# Patient Record
Sex: MALE | Race: WHITE | NOT HISPANIC OR LATINO | Employment: OTHER | URBAN - METROPOLITAN AREA
[De-identification: names, ages, dates, MRNs, and addresses within clinical notes are randomized per-mention and may not be internally consistent; named-entity substitution may affect disease eponyms.]

---

## 2023-08-26 ENCOUNTER — HOSPITAL ENCOUNTER (EMERGENCY)
Facility: HOSPITAL | Age: 83
Discharge: HOME | End: 2023-08-27
Attending: EMERGENCY MEDICINE
Payer: MEDICARE

## 2023-08-26 VITALS
WEIGHT: 167 LBS | HEIGHT: 67 IN | DIASTOLIC BLOOD PRESSURE: 82 MMHG | HEART RATE: 50 BPM | OXYGEN SATURATION: 96 % | BODY MASS INDEX: 26.21 KG/M2 | SYSTOLIC BLOOD PRESSURE: 176 MMHG | RESPIRATION RATE: 13 BRPM | TEMPERATURE: 98 F

## 2023-08-26 DIAGNOSIS — I63.9 ACUTE CVA (CEREBROVASCULAR ACCIDENT): Primary | ICD-10-CM

## 2023-08-26 DIAGNOSIS — I48.20 CHRONIC ATRIAL FIBRILLATION: ICD-10-CM

## 2023-08-26 DIAGNOSIS — I63.9 CVA (CEREBRAL VASCULAR ACCIDENT): ICD-10-CM

## 2023-08-26 PROBLEM — I65.23 BILATERAL CAROTID ARTERY OCCLUSION: Status: ACTIVE | Noted: 2023-08-26

## 2023-08-26 PROBLEM — I10 HYPERTENSION: Status: ACTIVE | Noted: 2023-08-26

## 2023-08-26 PROBLEM — G45.9 TIA (TRANSIENT ISCHEMIC ATTACK): Status: ACTIVE | Noted: 2023-08-26

## 2023-08-26 PROBLEM — I48.11 LONGSTANDING PERSISTENT ATRIAL FIBRILLATION: Status: ACTIVE | Noted: 2023-08-26

## 2023-08-26 PROBLEM — J61 ASBESTOSIS: Status: ACTIVE | Noted: 2023-08-26

## 2023-08-26 PROBLEM — K21.9 GASTROESOPHAGEAL REFLUX DISEASE: Status: ACTIVE | Noted: 2023-08-26

## 2023-08-26 PROBLEM — E78.2 MIXED HYPERLIPIDEMIA: Status: ACTIVE | Noted: 2023-08-26

## 2023-08-26 PROBLEM — I25.10 CORONARY ARTERIOSCLEROSIS IN NATIVE ARTERY: Status: ACTIVE | Noted: 2023-08-26

## 2023-08-26 PROBLEM — Z95.0 PACEMAKER: Status: ACTIVE | Noted: 2023-08-26

## 2023-08-26 LAB
ALBUMIN SERPL BCP-MCNC: 3.6 G/DL (ref 3.5–5.2)
ALP SERPL-CCNC: 97 U/L (ref 55–135)
ALT SERPL W/O P-5'-P-CCNC: 13 U/L (ref 10–44)
ANION GAP SERPL CALC-SCNC: 11 MMOL/L (ref 8–16)
AST SERPL-CCNC: 15 U/L (ref 10–40)
BACTERIA #/AREA URNS HPF: ABNORMAL /HPF
BASOPHILS # BLD AUTO: 0.03 K/UL (ref 0–0.2)
BASOPHILS NFR BLD: 0.6 % (ref 0–1.9)
BILIRUB SERPL-MCNC: 0.7 MG/DL (ref 0.1–1)
BILIRUB UR QL STRIP: NEGATIVE
BUN SERPL-MCNC: 31 MG/DL (ref 8–23)
CALCIUM SERPL-MCNC: 8.6 MG/DL (ref 8.7–10.5)
CHLORIDE SERPL-SCNC: 109 MMOL/L (ref 95–110)
CLARITY UR: CLEAR
CO2 SERPL-SCNC: 20 MMOL/L (ref 23–29)
COLOR UR: YELLOW
CREAT SERPL-MCNC: 2 MG/DL (ref 0.5–1.4)
DIFFERENTIAL METHOD: ABNORMAL
EOSINOPHIL # BLD AUTO: 0.1 K/UL (ref 0–0.5)
EOSINOPHIL NFR BLD: 1.5 % (ref 0–8)
ERYTHROCYTE [DISTWIDTH] IN BLOOD BY AUTOMATED COUNT: 15.7 % (ref 11.5–14.5)
EST. GFR  (NO RACE VARIABLE): 32.3 ML/MIN/1.73 M^2
ETHANOL SERPL-MCNC: <10 MG/DL (ref 0–10)
GLUCOSE SERPL-MCNC: 99 MG/DL (ref 70–110)
GLUCOSE UR QL STRIP: NEGATIVE
HCT VFR BLD AUTO: 39.2 % (ref 40–54)
HGB BLD-MCNC: 12.5 G/DL (ref 14–18)
HGB UR QL STRIP: NEGATIVE
HYALINE CASTS #/AREA URNS LPF: ABNORMAL /LPF
IMM GRANULOCYTES # BLD AUTO: 0.01 K/UL (ref 0–0.04)
IMM GRANULOCYTES NFR BLD AUTO: 0.2 % (ref 0–0.5)
INR PPP: 1.2 (ref 0.8–1.2)
KETONES UR QL STRIP: ABNORMAL
LEUKOCYTE ESTERASE UR QL STRIP: NEGATIVE
LYMPHOCYTES # BLD AUTO: 1 K/UL (ref 1–4.8)
LYMPHOCYTES NFR BLD: 17.6 % (ref 18–48)
MAGNESIUM SERPL-MCNC: 2 MG/DL (ref 1.6–2.6)
MCH RBC QN AUTO: 28.3 PG (ref 27–31)
MCHC RBC AUTO-ENTMCNC: 31.9 G/DL (ref 32–36)
MCV RBC AUTO: 89 FL (ref 82–98)
MICROSCOPIC COMMENT: ABNORMAL
MONOCYTES # BLD AUTO: 0.5 K/UL (ref 0.3–1)
MONOCYTES NFR BLD: 10 % (ref 4–15)
NEUTROPHILS # BLD AUTO: 3.8 K/UL (ref 1.8–7.7)
NEUTROPHILS NFR BLD: 70.1 % (ref 38–73)
NITRITE UR QL STRIP: NEGATIVE
NRBC BLD-RTO: 0 /100 WBC
PH UR STRIP: 6 [PH] (ref 5–8)
PLATELET # BLD AUTO: 135 K/UL (ref 150–450)
PMV BLD AUTO: 10.3 FL (ref 9.2–12.9)
POTASSIUM SERPL-SCNC: 4 MMOL/L (ref 3.5–5.1)
PROT SERPL-MCNC: 7.3 G/DL (ref 6–8.4)
PROT UR QL STRIP: ABNORMAL
PROTHROMBIN TIME: 12.7 SEC (ref 9–12.5)
RBC # BLD AUTO: 4.42 M/UL (ref 4.6–6.2)
RBC #/AREA URNS HPF: 0 /HPF (ref 0–4)
SODIUM SERPL-SCNC: 140 MMOL/L (ref 136–145)
SP GR UR STRIP: 1.02 (ref 1–1.03)
SQUAMOUS #/AREA URNS HPF: ABNORMAL /HPF
URN SPEC COLLECT METH UR: ABNORMAL
UROBILINOGEN UR STRIP-ACNC: 1 EU/DL
WBC # BLD AUTO: 5.39 K/UL (ref 3.9–12.7)
WBC #/AREA URNS HPF: 0 /HPF (ref 0–5)

## 2023-08-26 PROCEDURE — 96360 HYDRATION IV INFUSION INIT: CPT

## 2023-08-26 PROCEDURE — 99285 EMERGENCY DEPT VISIT HI MDM: CPT | Mod: 25

## 2023-08-26 PROCEDURE — 85025 COMPLETE CBC W/AUTO DIFF WBC: CPT | Performed by: EMERGENCY MEDICINE

## 2023-08-26 PROCEDURE — 93005 ELECTROCARDIOGRAM TRACING: CPT

## 2023-08-26 PROCEDURE — 93010 ELECTROCARDIOGRAM REPORT: CPT | Mod: ,,, | Performed by: INTERNAL MEDICINE

## 2023-08-26 PROCEDURE — 82077 ASSAY SPEC XCP UR&BREATH IA: CPT | Performed by: EMERGENCY MEDICINE

## 2023-08-26 PROCEDURE — 70450 CT HEAD/BRAIN W/O DYE: CPT | Mod: TC

## 2023-08-26 PROCEDURE — 70450 CT HEAD/BRAIN W/O DYE: CPT | Mod: 26,,, | Performed by: RADIOLOGY

## 2023-08-26 PROCEDURE — 36415 COLL VENOUS BLD VENIPUNCTURE: CPT | Performed by: EMERGENCY MEDICINE

## 2023-08-26 PROCEDURE — 93010 EKG 12-LEAD: ICD-10-PCS | Mod: ,,, | Performed by: INTERNAL MEDICINE

## 2023-08-26 PROCEDURE — 70450 CT HEAD WITHOUT CONTRAST: ICD-10-PCS | Mod: 26,,, | Performed by: RADIOLOGY

## 2023-08-26 PROCEDURE — G0426 PR INPT TELEHEALTH CONSULT 50M: ICD-10-PCS | Mod: 95,,, | Performed by: PSYCHIATRY & NEUROLOGY

## 2023-08-26 PROCEDURE — 81000 URINALYSIS NONAUTO W/SCOPE: CPT | Performed by: EMERGENCY MEDICINE

## 2023-08-26 PROCEDURE — G0426 INPT/ED TELECONSULT50: HCPCS | Mod: 95,,, | Performed by: PSYCHIATRY & NEUROLOGY

## 2023-08-26 PROCEDURE — 25000003 PHARM REV CODE 250: Performed by: EMERGENCY MEDICINE

## 2023-08-26 PROCEDURE — 83735 ASSAY OF MAGNESIUM: CPT | Performed by: EMERGENCY MEDICINE

## 2023-08-26 PROCEDURE — 85610 PROTHROMBIN TIME: CPT | Performed by: EMERGENCY MEDICINE

## 2023-08-26 PROCEDURE — 80053 COMPREHEN METABOLIC PANEL: CPT | Performed by: EMERGENCY MEDICINE

## 2023-08-26 RX ORDER — ASPIRIN 325 MG
325 TABLET ORAL
Status: COMPLETED | OUTPATIENT
Start: 2023-08-26 | End: 2023-08-26

## 2023-08-26 RX ORDER — ALPRAZOLAM 1 MG/1
TABLET, EXTENDED RELEASE ORAL DAILY
COMMUNITY

## 2023-08-26 RX ORDER — ATORVASTATIN CALCIUM 40 MG/1
40 TABLET, FILM COATED ORAL DAILY
COMMUNITY

## 2023-08-26 RX ORDER — SODIUM CHLORIDE 9 MG/ML
1000 INJECTION, SOLUTION INTRAVENOUS
Status: COMPLETED | OUTPATIENT
Start: 2023-08-26 | End: 2023-08-26

## 2023-08-26 RX ORDER — CLOPIDOGREL BISULFATE 75 MG/1
300 TABLET ORAL
Status: COMPLETED | OUTPATIENT
Start: 2023-08-26 | End: 2023-08-26

## 2023-08-26 RX ORDER — METOPROLOL TARTRATE 25 MG/1
25 TABLET, FILM COATED ORAL 2 TIMES DAILY
COMMUNITY

## 2023-08-26 RX ORDER — HYDROCODONE BITARTRATE AND ACETAMINOPHEN 10; 325 MG/1; MG/1
1 TABLET ORAL
Status: COMPLETED | OUTPATIENT
Start: 2023-08-26 | End: 2023-08-26

## 2023-08-26 RX ORDER — LEVOFLOXACIN 250 MG/1
250 TABLET ORAL DAILY
Status: ON HOLD | COMMUNITY
End: 2023-08-29 | Stop reason: HOSPADM

## 2023-08-26 RX ORDER — VALSARTAN 320 MG/1
320 TABLET ORAL DAILY
COMMUNITY

## 2023-08-26 RX ORDER — VITAMIN B COMPLEX
1 CAPSULE ORAL DAILY
COMMUNITY

## 2023-08-26 RX ORDER — PANTOPRAZOLE SODIUM 40 MG/1
40 TABLET, DELAYED RELEASE ORAL DAILY
COMMUNITY

## 2023-08-26 RX ADMIN — HYDROCODONE BITARTRATE AND ACETAMINOPHEN 1 TABLET: 10; 325 TABLET ORAL at 10:08

## 2023-08-26 RX ADMIN — ASPIRIN 325 MG ORAL TABLET 325 MG: 325 PILL ORAL at 04:08

## 2023-08-26 RX ADMIN — SODIUM CHLORIDE 1000 ML: 9 INJECTION, SOLUTION INTRAVENOUS at 06:08

## 2023-08-26 RX ADMIN — SODIUM CHLORIDE 500 ML: 9 INJECTION, SOLUTION INTRAVENOUS at 05:08

## 2023-08-26 RX ADMIN — SODIUM CHLORIDE 1000 ML: 9 INJECTION, SOLUTION INTRAVENOUS at 04:08

## 2023-08-26 RX ADMIN — CLOPIDOGREL BISULFATE 300 MG: 75 TABLET ORAL at 06:08

## 2023-08-26 NOTE — CONSULTS
[facility]  Vascular Neurology  Comprehensive Stroke Center  TeleVascular Neurology Acute Consultation Note      Consults    Consulting Provider: ADDIE SHAFER  Current Providers  No providers found    Patient Location:  [facility] EMERGENCY DEPARTMENT Emergency Department  Spoke hospital nurse at bedside with patient assisting consultant.     Patient information was obtained from patient.         Assessment/Plan:       Diagnoses:   Neuro  TIA (transient ischemic attack)  Majority of symptoms are now resolved (Left face, dysarthria)  Some subtle e/o sensory extension on left which is not 100% present on multiple exams.  Plan on stat CTA @ spoke to r/o high risk vascular lesion requiring intervention or further monitoring and transfer.  Load aspirin 325 mg & clopidogrel 300 mg x 1 now, followed by daily aspirin 81 mg /  clopidogrel 75 mg x 30 days followed by monotherapy therafter  Recommend:  - MRI brain w/o contrast to evaluate for presence and characterization of infarct  - TTE   - lipid profile and A1c for evaluation of modifiable risk factors  - PT/OT/SLP eval and Rx  - Permissive HTN < 220/120 mmHg x48-72h pending results of vessel imaging            STROKE DOCUMENTATION     Acute Stroke Times:   Acute Stroke Times   Last Known Normal Time: 1550  Symptom Onset Date: 08/26/23  Symptom Onset Time: 1550  Stroke Team Called Time: 1642  Stroke Team Arrival Time: 1646  CT Interpretation Time: 1646    NIH Scale:  1a. Level of Consciousness: 0-->Alert, keenly responsive  1b. LOC Questions: 0-->Answers both questions correctly  1c. LOC Commands: 0-->Performs both tasks correctly  2. Best Gaze: 0-->Normal  3. Visual: 0-->No visual loss  4. Facial Palsy: 0-->Normal symmetrical movements  5a. Motor Arm, Left: 0-->No drift, limb holds 90 (or 45) degrees for full 10 secs  5b. Motor Arm, Right: 0-->No drift, limb holds 90 (or 45) degrees for full 10 secs  6a. Motor Leg, Left: 0-->No  "drift, leg holds 30 degree position for full 5 secs  6b. Motor Leg, Right: 0-->No drift, leg holds 30 degree position for full 5 secs  7. Limb Ataxia: 0-->Absent  8. Sensory: 0-->Normal, no sensory loss  9. Best Language: 0-->No aphasia, normal  10. Dysarthria: 0-->Normal  11. Extinction and Inattention (formerly Neglect): 1-->Visual, tactile, auditory, spatial, or personal inattention or extinction to bilateral simultaneous stimulation in one of the sensory modalities  Total (NIH Stroke Scale): 1     Modified Jameel    [place] Coma Scale:    ABCD2 Score:    [id number] Score:   HAS -BLED Score:   ICH Score:   Hunt & Hernandez Classification:       Blood pressure (!) 148/76, pulse 63, resp. rate 18, height 5' 7" (1.702 m), weight 75.8 kg (167 lb), SpO2 98 %.  Eligible for thrombolytic therapy?: No  Thrombolytic therapy recomended: Thrombolytic therapy not recommended due to Mild Non-Disabling Symptoms  Possible Interventional Revascularization Candidate? No; no significant neurologic deficit (NIHSS <6)     Disposition Recommendation: pending further studies    Subjective:     History of Present Illness:  83M while outside setting up a fence he started to have slurred speech and facial asymmetry with left sided weakness.          No new subjective & objective note has been filed under this hospital service since the last note was generated.      Recommended the emergency room physician to have a brief discussion with the patient and/or family if available regarding the  risks and benefits of treatment, and to briefly document the occurrence of that discussion in his clinical encounter note.     The attending portion of this evaluation, treatment, and documentation was performed per Ministerio Acuna MD via audiovisual.    Billing code:  (non-intervention mild to moderate stroke, TIA, some mimics)      This patient has a critical neurological condition/illness, with some potential for high morbidity and " mortality.  There is a moderate probability for acute neurological change leading to clinical and possibly life-threatening deterioration requiring highest level of physician preparedness for urgent intervention.  Care was coordinated with other physicians involved in the patient's care.  Radiologic studies and laboratory data were reviewed and interpreted, and plan of care was re-assessed based on the results.  Diagnosis, treatment options and prognosis may have been discussed with the patient and/or family members or caregiver.    In your opinion, this was a: Tier 1 Van Positive    Consult End Time: 5:06 PM     Ministerio Acuna MD  Comprehensive Stroke Center  Vascular Neurology   [facility]

## 2023-08-26 NOTE — ASSESSMENT & PLAN NOTE
Majority of symptoms are now resolved (Left face, dysarthria)  Some subtle e/o sensory extension on left which is not 100% present on multiple exams.  Plan on stat CTA @ spoke to r/o high risk vascular lesion requiring intervention or further monitoring and transfer.  Load aspirin 325 mg & clopidogrel 300 mg x 1 now, followed by daily aspirin 81 mg /  clopidogrel 75 mg x 30 days followed by monotherapy therafter  Recommend:  - MRI brain w/o contrast to evaluate for presence and characterization of infarct  - TTE   - lipid profile and A1c for evaluation of modifiable risk factors  - PT/OT/SLP eval and Rx  - Permissive HTN < 220/120 mmHg x48-72h pending results of vessel imaging

## 2023-08-26 NOTE — PROVIDER PROGRESS NOTES - EMERGENCY DEPT.
Encounter Date: 8/26/2023    ED Physician Progress Notes        Physician Note:   Discussed case with  Dr Maurer and per neuro recs, pt will not be anticoagulated at this time. The want ASA and plavid 300mg load. Transfer to [facility] in progress. Pt and family agree with plan.

## 2023-08-26 NOTE — ED PROVIDER NOTES
Encounter Date: 8/26/2023       History   No chief complaint on file.    Patient is an 83-year-old male here with medics who were called because the patient developed acute left-sided weakness and slurred speech.  Last known normal 15:50.  Patient reports he was working outside on a fence when he felt a little funny and had family and friends  on his left-sided weakness.  No history of stroke.  Patient does report having 1 beer earlier today while outside working.  No focal pain currently.  No nausea or vomiting.    The history is provided by the patient and the EMS personnel.     Review of patient's allergies indicates:  Not on File  No past medical history on file.  No past surgical history on file.  No family history on file.     Review of Systems   Constitutional:  Negative for fever.   HENT:  Negative for sore throat.    Respiratory:  Negative for shortness of breath.    Cardiovascular:  Negative for chest pain.        Patient has history of bilateral vertebral artery stenosis, coronary artery bypass and atrial fibrillation.  Patient takes aspirin and Plavix.   Gastrointestinal:  Negative for nausea.   Genitourinary:  Negative for dysuria.   Musculoskeletal:  Negative for back pain.   Skin:  Negative for rash.   Neurological:  Positive for weakness.   Hematological:  Does not bruise/bleed easily.   All other systems reviewed and are negative.      Physical Exam     Initial Vitals   BP Pulse Resp Temp SpO2   08/26/23 1649 08/26/23 1649 08/26/23 1649 08/26/23 1708 08/26/23 1649   (!) 148/76 63 20 97.8 °F (36.6 °C) 98 %      MAP       --                Physical Exam    Nursing note and vitals reviewed.  Constitutional: He appears well-developed and well-nourished.   HENT:   Head: Atraumatic.   Eyes: EOM are normal.   Neck: Neck supple.   Cardiovascular:  Normal rate.           Pulmonary/Chest: No respiratory distress.   Abdominal: Abdomen is soft.   Musculoskeletal:         General: No edema.      Cervical  back: Neck supple.     Neurological: He is oriented to person, place, and time.   Mild left lower facial weakness, mild dysarthria, EOMI, no aphasia, left upper extremity drift, no right upper extremity drift, no lower extremity drift.  NIHSS 3.   Skin: Skin is warm and dry.   Psychiatric: He has a normal mood and affect.         ED Course   Critical Care    Date/Time: 8/26/2023 5:54 PM    Performed by: Ray Funez MD  Authorized by: Ray Funez MD  Total critical care time (exclusive of procedural time) : 0 minutes  Comments: 35 minutes of total critical care time between bedside care, medication selection, medication response evaluation and data interpretation.         Labs Reviewed   CBC W/ AUTO DIFFERENTIAL   COMPREHENSIVE METABOLIC PANEL   PROTIME-INR   URINALYSIS, REFLEX TO URINE CULTURE   MAGNESIUM   ALCOHOL,MEDICAL (ETHANOL)     EKG Readings: (Independently Interpreted)   17:06 EKG # 1 ATRIAL FIBRILLATION AT 71 BEATS PER MINUTE, NORMAL QT, T-WAVE INVERSION, NO EDD ST ELEVATION       Imaging Results              CT Head Without Contrast (Final result)  Result time 08/26/23 16:43:43      Final result by Kika Wood MD (08/26/23 16:43:43)                   Impression:      No acute abnormality.      Electronically signed by: Kika Wood MD  Date:    08/26/2023  Time:    16:43               Narrative:    EXAMINATION:  CT HEAD WITHOUT CONTRAST    CLINICAL HISTORY:  Altered mental status, nontraumatic (Ped 0-18y);    TECHNIQUE:  Low dose axial CT images obtained throughout the head without intravenous contrast. Sagittal and coronal reconstructions were performed.    COMPARISON:  None.    FINDINGS:  Intracranial compartment:    Ventricles and sulci are normal in size for age without evidence of hydrocephalus. No extra-axial blood or fluid collections.    The brain parenchyma appears normal. No parenchymal mass, hemorrhage, edema or major vascular distribution  infarct.    Skull/extracranial contents (limited evaluation): No fracture. Mastoid air cells and paranasal sinuses are essentially clear.                                       Medications   0.9%  NaCl infusion (1,000 mLs Intravenous New Bag 8/26/23 1658)   aspirin tablet 325 mg (325 mg Oral Given 8/26/23 1658)     Medical Decision Making  Differential diagnosis: Acute stroke, space-occupying lesion.  Patient is an 83-year-old male with acute onset left sided weakness consistent with stroke.  We will image and expand workup as needed.    Amount and/or Complexity of Data Reviewed  Labs: ordered.  Radiology: ordered.    Risk  OTC drugs.  Prescription drug management.               ED Course as of 08/26/23 1721   Sat Aug 26, 2023   1657 Facial weakness, dysarthria and drift have ceased.  Patient still has extinction on the left. NIHSS 1.  Discussed with tele Neurologist who recommends CTA head and neck, aspirin and if need be tPA up until 4.5 hours after last known normal (15:50) [RJ]      ED Course User Index  [RJ] Ray Funez MD                    Clinical Impression:   Final diagnoses:  [I63.9] CVA (cerebral vascular accident)               Ray Funez MD  08/29/23 0393

## 2023-08-26 NOTE — CONSULTS
No MRI --> pacemaker  DAPT as written in consult.  Likely will need anticoagulation for secondary prevention in setting of afib.          Kamar Silva MD  Vascular and Interventional Neurology  , Department of Neurology  Section Head, Vascular Neurology  Departments of Neurology, Neurosurgery and Radiology  [facility] [place], [place]

## 2023-08-26 NOTE — HPI
83M while outside setting up a fence he started to have slurred speech and facial asymmetry with left sided weakness.

## 2023-08-26 NOTE — PROVIDER TRANSFER
"   Outside Transfer Acceptance Note / Regional Referral Center    Referring facility: [facility]   Referring provider: ADDIE SHAFER  Accepting facility: OTHER  Accepting provider: ALEX RUSSO  Admitting provider: NABEEL BORRERO  Reason for transfer: Higher Level of Care   Transfer diagnosis: TIA  Transfer specialty requested: Neurology  Transfer specialty notified: Yes  Transfer level: NUMBER 1-5: 2  Bed type requested: MED TELE  Isolation status: No active isolations   Admission class or status: OP- Observation      Narrative     Mr. Castro is an 84yo man with a past medical history of CAD, CABG x 3, Dressler's post CABG, asbestosis, HLD, pacemaker, and bilateral carotid artery stenosis.    Per sending MD, "here with medics who were called because the patient developed acute left-sided weakness and slurred speech.  Last known normal 15:50.  Patient reports he was working outside on a fence when he felt a little funny and had family and friends  on his left-sided weakness.  No history of stroke.  Patient does report having 1 beer earlier today while outside working.  No focal pain currently.  No nausea or vomiting."    In the ED his VS are BP (!) 127/49   Pulse 65   Temp 97.8 °F (36.6 °C)   Resp 16   Ht 5' 7" (1.702 m)   Wt 75.8 kg (167 lb)   SpO2 96%   BMI 26.16 kg/m².    VS since arrival are:  Temp:  [97.8 °F (36.6 °C)] 97.8 °F (36.6 °C)  Pulse:  [63-65] 65  Resp:  [16-20] 16  SpO2:  [96 %-98 %] 96 %  BP: (127-148)/(49-76) 127/49     Labs showed:  HG 12.5, , INR 1.2  CR 2, K 4, CA 8.6, ALB 3.6, NML LFT  ETOH < 10, UA NEG  No old labs for comparison    NC CT HEAD:  Ventricles and sulci are normal in size for age without evidence of hydrocephalus.   No extra-axial blood or fluid collections.  The brain parenchyma appears normal.   No parenchymal mass, hemorrhage, edema or major vascular distribution infarct.    Skull/extracranial contents (limited evaluation): No fracture. "   Mastoid air cells and paranasal sinuses are essentially clear.     Impression:   No acute abnormality.    In the ED he was treated with:  Medications   sodium chloride 0.9% bolus 500 mL 500 mL (500 mLs Intravenous New Bag 8/26/23 1724)   0.9%  NaCl infusion (has no administration in time range)   0.9%  NaCl infusion (0 mLs Intravenous Stopped 8/26/23 1801)   aspirin tablet 325 mg (325 mg Oral Given 8/26/23 1658)     Per tele-Neuro stroke consult by Dr. Acuna:  TIA (transient ischemic attack)  Majority of symptoms are now resolved (Left face, dysarthria)  Some subtle e/o sensory extension on left which is not 100% present on multiple exams.  Plan on stat CTA @ spoke to r/o high risk vascular lesion requiring intervention or further monitoring and transfer.  Load aspirin 325 mg & clopidogrel 300 mg x 1 now, followed by daily aspirin 81 mg /  clopidogrel 75 mg x 30 days followed by monotherapy therafter  Recommend:  - MRI brain w/o contrast to evaluate for presence and characterization of infarct  - TTE   - lipid profile and A1c for evaluation of modifiable risk factors  - PT/OT/SLP eval and Rx  - Permissive HTN < 220/120 mmHg x48-72h pending results of vessel imaging      ADDENDUM:  On further review, patient noted to have pacemaker in place, so he cannot have MRI at [facility].  His EKG shows atrial fibrillation, a diagnosis not mentioned in Care Everywhere.  I reached back out to Neuro on call, Dr. Silva.  He felt not being able to get MRI would not  with an NIH stroke scale of 1, so he was okay with the patient going to a [facility] hospital and not performing MRI.  Regarding his Afib, Dr. Silva recommended bridging with ASA 325mg and Plavix 300mg as noted above, until formal Neuro and Cards consults could be obtained tomorrow at [place].    Objective        Recent Labs: All pertinent labs within the past 24 hours have been reviewed.     Recent Results (from the past 24 hour(s))    Complete Blood Count (CBC)    Collection Time: 08/26/23  4:50 PM   Result Value Ref Range    WBC 5.39 3.90 - 12.70 K/uL    RBC 4.42 (L) 4.60 - 6.20 M/uL    Hemoglobin 12.5 (L) 14.0 - 18.0 g/dL    Hematocrit 39.2 (L) 40.0 - 54.0 %    MCV 89 82 - 98 fL    MCH 28.3 27.0 - 31.0 pg    MCHC 31.9 (L) 32.0 - 36.0 g/dL    RDW 15.7 (H) 11.5 - 14.5 %    Platelets 135 (L) 150 - 450 K/uL    MPV 10.3 9.2 - 12.9 fL    Immature Granulocytes 0.2 0.0 - 0.5 %    Gran # (ANC) 3.8 1.8 - 7.7 K/uL    Immature Grans (Abs) 0.01 0.00 - 0.04 K/uL    Lymph # 1.0 1.0 - 4.8 K/uL    Mono # 0.5 0.3 - 1.0 K/uL    Eos # 0.1 0.0 - 0.5 K/uL    Baso # 0.03 0.00 - 0.20 K/uL    nRBC 0 0 /100 WBC    Gran % 70.1 38.0 - 73.0 %    Lymph % 17.6 (L) 18.0 - 48.0 %    Mono % 10.0 4.0 - 15.0 %    Eosinophil % 1.5 0.0 - 8.0 %    Basophil % 0.6 0.0 - 1.9 %    Differential Method Automated    Comprehensive Metabolic Panel (CMP)    Collection Time: 08/26/23  4:50 PM   Result Value Ref Range    Sodium 140 136 - 145 mmol/L    Potassium 4.0 3.5 - 5.1 mmol/L    Chloride 109 95 - 110 mmol/L    CO2 20 (L) 23 - 29 mmol/L    Glucose 99 70 - 110 mg/dL    BUN 31 (H) 8 - 23 mg/dL    Creatinine 2.0 (H) 0.5 - 1.4 mg/dL    Calcium 8.6 (L) 8.7 - 10.5 mg/dL    Total Protein 7.3 6.0 - 8.4 g/dL    Albumin 3.6 3.5 - 5.2 g/dL    Total Bilirubin 0.7 0.1 - 1.0 mg/dL    Alkaline Phosphatase 97 55 - 135 U/L    AST 15 10 - 40 U/L    ALT 13 10 - 44 U/L    eGFR 32.3 (A) >60 mL/min/1.73 m^2    Anion Gap 11 8 - 16 mmol/L   Protime-INR    Collection Time: 08/26/23  4:50 PM   Result Value Ref Range    Prothrombin Time 12.7 (H) 9.0 - 12.5 sec    INR 1.2 0.8 - 1.2   Magnesium    Collection Time: 08/26/23  4:50 PM   Result Value Ref Range    Magnesium 2.0 1.6 - 2.6 mg/dL   Ethanol    Collection Time: 08/26/23  4:50 PM   Result Value Ref Range    Alcohol, Serum <10 0 - 10 mg/dL   Urinalysis, Reflex to Urine Culture Urine, Clean Catch    Collection Time: 08/26/23  5:53 PM    Specimen: Urine, Clean  Catch   Result Value Ref Range    Specimen UA Urine, Unspecified     Color, UA Yellow Yellow, Straw, Meeta    Appearance, UA Clear Clear    pH, UA 6.0 5.0 - 8.0    Specific Gravity, UA 1.020 1.005 - 1.030    Protein, UA 1+ (A) Negative    Glucose, UA Negative Negative    Ketones, UA Trace (A) Negative    Bilirubin (UA) Negative Negative    Occult Blood UA Negative Negative    Nitrite, UA Negative Negative    Urobilinogen, UA 1.0 Negative EU/dL    Leukocytes, UA Negative Negative   Urinalysis Microscopic    Collection Time: 08/26/23  5:53 PM   Result Value Ref Range    RBC, UA 0 0 - 4 /hpf    WBC, UA 0 0 - 5 /hpf    Bacteria Rare None-Occ /hpf    Squam Epithel, UA occasional /hpf    Hyaline Casts, UA occasional (A) 0-1/lpf /lpf    Microscopic Comment SEE COMMENT             IV access:        Peripheral IV - Single Lumen 08/26/23 1650 20 G Anterior;Distal;Right Forearm (Active)            Peripheral IV - Single Lumen 08/26/23 1805 18 G Anterior;Distal;Right Upper Arm (Active)     Infusions: NONE  Allergies:   Review of patient's allergies indicates:   Allergen Reactions    Contrast media Other (See Comments)      NPO: No    Anticoagulation:   Anticoagulants       None             Instructions      [facility]  Admit to Hospital Medicine  Upon patient arrival to floor, please contact Hospital Medicine on call.

## 2023-08-26 NOTE — ED NOTES
Pt resting in bed. Respirations even and unlabored. Alert and oriented x4. Gcs 15. Pt appears to have a slight left sided facial droop. Per pts wife, pts speech is almost back to his baseline. No extremity weakness or drift noted.  strength equal. Pt and family informed of transfer to another facility for upgrade in care.

## 2023-08-27 ENCOUNTER — HOSPITAL ENCOUNTER (OUTPATIENT)
Facility: HOSPITAL | Age: 83
Discharge: HOME | End: 2023-08-29
Attending: INTERNAL MEDICINE | Admitting: INTERNAL MEDICINE
Payer: MEDICARE

## 2023-08-27 DIAGNOSIS — I63.9 STROKE: ICD-10-CM

## 2023-08-27 DIAGNOSIS — G45.9 TIA (TRANSIENT ISCHEMIC ATTACK): ICD-10-CM

## 2023-08-27 PROBLEM — D69.6 THROMBOCYTOPENIA: Status: ACTIVE | Noted: 2023-08-27

## 2023-08-27 PROBLEM — N17.9 AKI (ACUTE KIDNEY INJURY): Status: ACTIVE | Noted: 2023-08-27

## 2023-08-27 PROBLEM — Z95.1 S/P CABG X 3: Status: ACTIVE | Noted: 2023-08-27

## 2023-08-27 PROBLEM — D64.9 ANEMIA: Status: ACTIVE | Noted: 2023-08-27

## 2023-08-27 LAB
APTT PPP: 21.9 SEC (ref 21–32)
CHOLEST SERPL-MCNC: 110 MG/DL (ref 120–199)
CHOLEST/HDLC SERPL: 2.7 {RATIO} (ref 2–5)
ESTIMATED AVG GLUCOSE: 123 MG/DL (ref 68–131)
HBA1C MFR BLD: 5.9 % (ref 4.5–6.2)
HDLC SERPL-MCNC: 41 MG/DL (ref 40–75)
HDLC SERPL: 37.3 % (ref 20–50)
INR PPP: 1.1 (ref 0.8–1.2)
LDLC SERPL CALC-MCNC: 56 MG/DL (ref 63–159)
NONHDLC SERPL-MCNC: 69 MG/DL
PROTHROMBIN TIME: 12.8 SEC (ref 9–12.5)
TRIGL SERPL-MCNC: 65 MG/DL (ref 30–150)
TSH SERPL DL<=0.005 MIU/L-ACNC: 2.34 UIU/ML (ref 0.4–4)

## 2023-08-27 PROCEDURE — 84443 ASSAY THYROID STIM HORMONE: CPT | Performed by: NURSE PRACTITIONER

## 2023-08-27 PROCEDURE — 85610 PROTHROMBIN TIME: CPT | Performed by: NURSE PRACTITIONER

## 2023-08-27 PROCEDURE — 83036 HEMOGLOBIN GLYCOSYLATED A1C: CPT | Performed by: NURSE PRACTITIONER

## 2023-08-27 PROCEDURE — 63600175 PHARM REV CODE 636 W HCPCS: Performed by: INTERNAL MEDICINE

## 2023-08-27 PROCEDURE — 97530 THERAPEUTIC ACTIVITIES: CPT

## 2023-08-27 PROCEDURE — G0378 HOSPITAL OBSERVATION PER HR: HCPCS

## 2023-08-27 PROCEDURE — 94760 N-INVAS EAR/PLS OXIMETRY 1: CPT | Mod: XB

## 2023-08-27 PROCEDURE — 99900031 HC PATIENT EDUCATION (STAT)

## 2023-08-27 PROCEDURE — 97165 OT EVAL LOW COMPLEX 30 MIN: CPT

## 2023-08-27 PROCEDURE — 25000003 PHARM REV CODE 250: Performed by: NURSE PRACTITIONER

## 2023-08-27 PROCEDURE — 97535 SELF CARE MNGMENT TRAINING: CPT

## 2023-08-27 PROCEDURE — G0379 DIRECT REFER HOSPITAL OBSERV: HCPCS

## 2023-08-27 PROCEDURE — 97161 PT EVAL LOW COMPLEX 20 MIN: CPT

## 2023-08-27 PROCEDURE — 25000003 PHARM REV CODE 250: Performed by: INTERNAL MEDICINE

## 2023-08-27 PROCEDURE — 80061 LIPID PANEL: CPT | Performed by: NURSE PRACTITIONER

## 2023-08-27 PROCEDURE — 85730 THROMBOPLASTIN TIME PARTIAL: CPT | Performed by: NURSE PRACTITIONER

## 2023-08-27 RX ORDER — PANTOPRAZOLE SODIUM 40 MG/1
40 TABLET, DELAYED RELEASE ORAL DAILY
Status: DISCONTINUED | OUTPATIENT
Start: 2023-08-27 | End: 2023-08-29 | Stop reason: HOSPADM

## 2023-08-27 RX ORDER — ONDANSETRON 2 MG/ML
8 INJECTION INTRAMUSCULAR; INTRAVENOUS EVERY 6 HOURS PRN
Status: DISCONTINUED | OUTPATIENT
Start: 2023-08-27 | End: 2023-08-29 | Stop reason: HOSPADM

## 2023-08-27 RX ORDER — PREDNISONE 50 MG/1
50 TABLET ORAL ONCE
Status: COMPLETED | OUTPATIENT
Start: 2023-08-27 | End: 2023-08-27

## 2023-08-27 RX ORDER — DIPHENHYDRAMINE HYDROCHLORIDE 50 MG/ML
50 INJECTION INTRAMUSCULAR; INTRAVENOUS ONCE
Status: COMPLETED | OUTPATIENT
Start: 2023-08-27 | End: 2023-08-28

## 2023-08-27 RX ORDER — AMOXICILLIN 250 MG
1 CAPSULE ORAL 2 TIMES DAILY
Status: DISCONTINUED | OUTPATIENT
Start: 2023-08-27 | End: 2023-08-29 | Stop reason: HOSPADM

## 2023-08-27 RX ORDER — LABETALOL HYDROCHLORIDE 5 MG/ML
10 INJECTION, SOLUTION INTRAVENOUS
Status: DISCONTINUED | OUTPATIENT
Start: 2023-08-27 | End: 2023-08-29 | Stop reason: HOSPADM

## 2023-08-27 RX ORDER — VALSARTAN 40 MG/1
160 TABLET ORAL 2 TIMES DAILY
Status: DISCONTINUED | OUTPATIENT
Start: 2023-08-27 | End: 2023-08-28

## 2023-08-27 RX ORDER — ATORVASTATIN CALCIUM 40 MG/1
40 TABLET, FILM COATED ORAL DAILY
Status: DISCONTINUED | OUTPATIENT
Start: 2023-08-27 | End: 2023-08-28

## 2023-08-27 RX ORDER — SODIUM CHLORIDE 0.9 % (FLUSH) 0.9 %
10 SYRINGE (ML) INJECTION
Status: DISCONTINUED | OUTPATIENT
Start: 2023-08-27 | End: 2023-08-29 | Stop reason: HOSPADM

## 2023-08-27 RX ORDER — ACETAMINOPHEN 325 MG/1
650 TABLET ORAL EVERY 6 HOURS PRN
Status: DISCONTINUED | OUTPATIENT
Start: 2023-08-27 | End: 2023-08-29 | Stop reason: HOSPADM

## 2023-08-27 RX ORDER — CLOPIDOGREL BISULFATE 75 MG/1
75 TABLET ORAL DAILY
Status: DISCONTINUED | OUTPATIENT
Start: 2023-08-27 | End: 2023-08-29 | Stop reason: HOSPADM

## 2023-08-27 RX ORDER — PREDNISONE 50 MG/1
50 TABLET ORAL ONCE
Status: COMPLETED | OUTPATIENT
Start: 2023-08-28 | End: 2023-08-28

## 2023-08-27 RX ORDER — ASPIRIN 81 MG/1
81 TABLET ORAL DAILY
Status: DISCONTINUED | OUTPATIENT
Start: 2023-08-27 | End: 2023-08-29 | Stop reason: HOSPADM

## 2023-08-27 RX ORDER — METOPROLOL TARTRATE 25 MG/1
25 TABLET, FILM COATED ORAL 2 TIMES DAILY
Status: DISCONTINUED | OUTPATIENT
Start: 2023-08-27 | End: 2023-08-29 | Stop reason: HOSPADM

## 2023-08-27 RX ADMIN — ASPIRIN 81 MG: 81 TABLET, COATED ORAL at 08:08

## 2023-08-27 RX ADMIN — METOPROLOL TARTRATE 25 MG: 25 TABLET, FILM COATED ORAL at 08:08

## 2023-08-27 RX ADMIN — ACETAMINOPHEN 650 MG: 325 TABLET, FILM COATED ORAL at 09:08

## 2023-08-27 RX ADMIN — ACETAMINOPHEN 650 MG: 325 TABLET, FILM COATED ORAL at 05:08

## 2023-08-27 RX ADMIN — PANTOPRAZOLE SODIUM 40 MG: 40 TABLET, DELAYED RELEASE ORAL at 08:08

## 2023-08-27 RX ADMIN — PREDNISONE 50 MG: 50 TABLET ORAL at 03:08

## 2023-08-27 RX ADMIN — PREDNISONE 50 MG: 50 TABLET ORAL at 09:08

## 2023-08-27 RX ADMIN — METOPROLOL TARTRATE 25 MG: 25 TABLET, FILM COATED ORAL at 09:08

## 2023-08-27 RX ADMIN — CLOPIDOGREL BISULFATE 75 MG: 75 TABLET, FILM COATED ORAL at 08:08

## 2023-08-27 RX ADMIN — VALSARTAN 160 MG: 40 TABLET, FILM COATED ORAL at 09:08

## 2023-08-27 RX ADMIN — VALSARTAN 160 MG: 40 TABLET, FILM COATED ORAL at 08:08

## 2023-08-27 RX ADMIN — ATORVASTATIN CALCIUM 40 MG: 40 TABLET, FILM COATED ORAL at 08:08

## 2023-08-27 NOTE — PLAN OF CARE
Problem: Adult Inpatient Plan of Care  Goal: Plan of Care Review  Outcome: Ongoing, Progressing  Goal: Patient-Specific Goal (Individualized)  Outcome: Ongoing, Progressing  Goal: Absence of Hospital-Acquired Illness or Injury  Outcome: Ongoing, Progressing  Goal: Optimal Comfort and Wellbeing  Outcome: Ongoing, Progressing  Goal: Readiness for Transition of Care  Outcome: Ongoing, Progressing     Problem: Adjustment to Illness (Stroke, Ischemic/Transient Ischemic Attack)  Goal: Optimal Coping  Outcome: Ongoing, Progressing     Problem: Bowel Elimination Impaired (Stroke, Ischemic/Transient Ischemic Attack)  Goal: Effective Bowel Elimination  Outcome: Ongoing, Progressing     Problem: Cerebral Tissue Perfusion (Stroke, Ischemic/Transient Ischemic Attack)  Goal: Optimal Cerebral Tissue Perfusion  Outcome: Ongoing, Progressing     Problem: Cognitive Impairment (Stroke, Ischemic/Transient Ischemic Attack)  Goal: Optimal Cognitive Function  Outcome: Ongoing, Progressing     Problem: Communication Impairment (Stroke, Ischemic/Transient Ischemic Attack)  Goal: Improved Communication Skills  Outcome: Ongoing, Progressing     Problem: Functional Ability Impaired (Stroke, Ischemic/Transient Ischemic Attack)  Goal: Optimal Functional Ability  Outcome: Ongoing, Progressing     Problem: Respiratory Compromise (Stroke, Ischemic/Transient Ischemic Attack)  Goal: Effective Oxygenation and Ventilation  Outcome: Ongoing, Progressing     Problem: Sensorimotor Impairment (Stroke, Ischemic/Transient Ischemic Attack)  Goal: Improved Sensorimotor Function  Outcome: Ongoing, Progressing     Problem: Swallowing Impairment (Stroke, Ischemic/Transient Ischemic Attack)  Goal: Optimal Eating and Swallowing without Aspiration  Outcome: Ongoing, Progressing     Problem: Urinary Elimination Impaired (Stroke, Ischemic/Transient Ischemic Attack)  Goal: Effective Urinary Elimination  Outcome: Ongoing, Progressing     Problem: Fall Injury  Risk  Goal: Absence of Fall and Fall-Related Injury  Outcome: Ongoing, Progressing     Problem: Oral Intake Inadequate (Acute Kidney Injury/Impairment)  Goal: Optimal Nutrition Intake  Outcome: Ongoing, Progressing     Problem: Renal Function Impairment (Acute Kidney Injury/Impairment)  Goal: Effective Renal Function  Outcome: Ongoing, Progressing     Plan of care reviewed with patient. Safety maintained with bed in lowest position, wheels locked, side rails up x2 and call button within reach. Patient instructed to call for any assistance.

## 2023-08-27 NOTE — CONSULTS
"[facility]  Department of Neurology  Neurology Consultation Note        PATIENT NAME: Min Castro  MRN: [id number]  CSN: [id number]      TODAY'S DATE: 08/27/2023  ADMIT DATE: 8/27/2023                            CONSULTING PROVIDER: Jake Stevens MD  CONSULT REQUESTED BY: Melva Toscano MD      Reason for consult: TIA       History obtained from chart review and the patient.    HPI per EMR: Min Castro is a 83 y.o. male with a history of CAD with CABG x 3, HLD, pacemaker and carotid stenosis, who presents as a transfer from [facility] to [facility] for neurology services.   Per  (Dr. Maurer):  "Mr. Castro is an 84yo man with a past medical history of CAD, CABG x 3, Dressler's post CABG, asbestosis, HLD, pacemaker, and bilateral carotid artery stenosis.     Per sending MD, "here with medics who were called because the patient developed acute left-sided weakness and slurred speech.  Last known normal 15:50.  Patient reports he was working outside on a fence when he felt a little funny and had family and friends  on his left-sided weakness.  No history of stroke.  Patient does report having 1 beer earlier today while outside working.  No focal pain currently.  No nausea or vomiting."    Neurology consult: Patient was seen and examined by me. He presented with facial droop and speech trouble with aphasia and dysarthria. No associated weakness or sensory changes however there was some reported L side weakness. He said he was working in his yard but he was in shade doing this. Denies any prior history of stroke.      Currently he feels back to baseline and denies any symptoms. CTH was done and negative for acute pathology. CTA was not done due to contrast allergy.     PREVIOUS MEDICAL HISTORY:  Past Medical History:   Diagnosis Date    Coronary artery disease     Hypertension      PREVIOUS SURGICAL HISTORY:  History reviewed. No pertinent surgical history.  FAMILY " MEDICAL HISTORY:  Family History   Family history unknown: Yes     SOCIAL HISTORY:  Social History     Tobacco Use    Smoking status: Never    Smokeless tobacco: Never   Substance Use Topics    Alcohol use: Yes     Comment: occasional    Drug use: Never     ALLERGIES:  Review of patient's allergies indicates:   Allergen Reactions    Iodinated contrast media     Contrast media Other (See Comments)     HOME MEDICATIONS:  Prior to Admission medications    Medication Sig Start Date End Date Taking? Authorizing Provider   ALPRAZolam (XANAX XR) 1 MG Tb24 Take by mouth once daily.   Yes Provider, Historical   atorvastatin (LIPITOR) 40 MG tablet Take 40 mg by mouth once daily.   Yes Provider, Historical   b complex vitamins capsule Take 1 capsule by mouth once daily.   Yes Provider, Historical   diphenhydrAMINE-acetaminophen (TYLENOL PM)  mg Tab Take 1 tablet by mouth nightly as needed.   Yes Provider, Historical   metoprolol tartrate (LOPRESSOR) 25 MG tablet Take 25 mg by mouth 2 (two) times daily.   Yes Provider, Historical   pantoprazole (PROTONIX) 40 MG tablet Take 40 mg by mouth once daily.   Yes Provider, Historical   valsartan (DIOVAN) 320 MG tablet Take 320 mg by mouth once daily.   Yes Provider, Historical   levoFLOXacin (LEVAQUIN) 250 MG tablet Take 250 mg by mouth once daily.    Provider, Historical     CURRENT SCHEDULED MEDICATIONS:   aspirin  81 mg Oral Daily    atorvastatin  40 mg Oral Daily    clopidogreL  75 mg Oral Daily    metoprolol tartrate  25 mg Oral BID    pantoprazole  40 mg Oral Daily    senna-docusate 8.6-50 mg  1 tablet Oral BID    valsartan  160 mg Oral BID     CURRENT INFUSIONS:   sodium chloride 0.9%       CURRENT PRN MEDICATIONS:  acetaminophen, labetaloL, ondansetron, sodium chloride 0.9%, sodium chloride 0.9%    REVIEW OF SYSTEMS:  Please refer to the HPI for all pertinent positive and negative findings. A comprehensive review of all other systems was negative.       PHYSICAL  "EXAM:  Patient Vitals for the past 24 hrs:   BP Temp Temp src Pulse Resp SpO2 Height Weight   08/27/23 0855 (!) 161/70 98.4 °F (36.9 °C) Oral 67 16 95 % -- --   08/27/23 0542 (!) 211/89 98.4 °F (36.9 °C) -- 64 (!) 26 95 % -- --   08/27/23 0323 (!) 184/81 98.7 °F (37.1 °C) Axillary (!) 56 (!) 21 (!) 94 % -- --   08/27/23 0030 (!) 180/90 98.6 °F (37 °C) Axillary 71 18 97 % -- --   08/27/23 0015 -- -- -- -- -- -- 5' 7" (1.702 m) 76.6 kg (168 lb 14 oz)       GENERAL APPEARANCE: Alert, well-developed, well-nourished male in no acute distress.  HEENT: Normocephalic and atraumatic. PERRL. Oropharynx unremarkable.  PULM: Normal respiratory effort. No accessory muscle use.  CV: RRR.  ABDOMEN: Soft, nontender.  EXTREMITIES: No obvious signs of vascular compromise. Pulses present. No cyanosis, clubbing or edema.  SKIN: Clear; no rashes, lesions or skin breaks in exposed areas.    NEURO:  MENTAL STATUS: Patient awake and oriented to time, place, and person, recent/remote memory normal, attention span/concentration normal, and speech fluent without paraphasic errors.  Affect euthymic.    CRANIAL NERVES:  CN I: Not tested.  CN II: Fundoscopic exam deferred.  CN III, IV, VI: Pupils equal, round and reactive to light.  Extraocular movements full and intact.  CN V: Facial sensation normal.  CN VII: Facial asymmetry absent.  CN VIII: Hearing grossly normal and equal bilaterally.  No skew deviation or pathologic nystagmus.  CN IX, X: Palate elevates symmetrically. Speech/articulation is clear without dysarthria.  CN XI: Shoulder shrug and chin rotation equal with good strength.  CN XII: Tongue protrusion midline.    MOTOR:  Bulk normal. Tone normal and symmetric throughout.  Abnormal movements absent.  Tremor: none present.  Strength 5/5 throughout.    REFLEXES:  DTRs 2+ throughout.  Plantar response downgoing bilaterally.  SENSATION: grossly intact throughout.  COORDINATION: normal finger-to-nose.  STATION: not tested.  GAIT: not " tested.      NIHSS:  1a      Level of Consciousness (alert, drowsy, etc.):   0=alert; keenly responsive  1b.     Level of Consciousness Questions (month, age): 0= able to answer both questions  1c.     Level of Consciousness Commands (open, close eyes, make fist, let go):  0=Answers both tasks correctly  2.      Best Gaze (eyes open - patient follows examiner's finger or face):      0=normal  3.      Visual (introduce visual stimulus/threat to patient's visual field quadrants):  0=No visual loss  4.      Facial Palsy (show teeth, raise eyebrows and squeeze eyes shut):        0=Normal symmetric movement  5a.     Motor Arm - Left (elevate extremity 90 degrees and score drift/movement):       0=No drift, limb holds 90 (or 45) degrees for full 10 seconds  5b.     Motor Arm - Right (elevate extremity 90 degrees and score drift/movement):      0=No drift, limb holds 90 (or 45) degrees for full 10 seconds  6a.     Motor Leg - Left (elevate extremity 30 degrees and score drift/movement):       0=No drift, limb holds 90 (or 45) degrees for full 10 seconds  6b      Motor Leg - Right (elevate extremity 30 degrees and score drift/movement):      0=No drift, limb holds 90 (or 45) degrees for full 10 seconds  7.      Limb Ataxia (finger-nose, heel down shin):      0=Absent  8.      Sensory (pin prick to face, arm, trunk, and leg - compare side to side):        0=Normal; no sensory loss  9.      Best Language (name items, describe a picture and read sentences):      0=No aphasia, normal  10.     Dysarthria (evaluate speech clarity by patient repeating listed words): 0=Normal  11.     Extinction and Inattention (use prior testing to identify neglect or double simultaneous stimuli testing):      0=No abnormality          NIH Stroke Scale Total:         0      Labs:  Recent Labs   Lab 08/26/23  1650      K 4.0      CO2 20*   BUN 31*   CREATININE 2.0*   GLU 99   CALCIUM 8.6*   MG 2.0     Recent Labs   Lab 08/26/23  1650  "  WBC 5.39   HGB 12.5*   HCT 39.2*   *     Recent Labs   Lab 08/26/23  1650   ALBUMIN 3.6   PROT 7.3   BILITOT 0.7   ALKPHOS 97   ALT 13   AST 15     Lab Results   Component Value Date    INR 1.1 08/27/2023     Lab Results   Component Value Date    TRIG 65 08/27/2023    HDL 41 08/27/2023    CHOLHDL 37.3 08/27/2023     No results found for: "HGBA1C"  No results found for: "PROTEINCSF", "GLUCCSF", "WBCCSF"    Imaging:  I have reviewed and interpreted the pertinent imaging and lab results.      CT Head Without Contrast  Narrative: EXAMINATION:  CT HEAD WITHOUT CONTRAST    CLINICAL HISTORY:  Altered mental status, nontraumatic (Ped 0-18y);    TECHNIQUE:  Low dose axial CT images obtained throughout the head without intravenous contrast. Sagittal and coronal reconstructions were performed.    COMPARISON:  None.    FINDINGS:  Intracranial compartment:    Ventricles and sulci are normal in size for age without evidence of hydrocephalus. No extra-axial blood or fluid collections.    The brain parenchyma appears normal. No parenchymal mass, hemorrhage, edema or major vascular distribution infarct.    Skull/extracranial contents (limited evaluation): No fracture. Mastoid air cells and paranasal sinuses are essentially clear.  Impression: No acute abnormality.    Electronically signed by: Kika Wood MD  Date:    08/26/2023  Time:    16:43         ASSESSMENT & PLAN:      TIA  Hypertension  Atrial fibrillation      Plan  Admitted for further stroke workup. CTH negative and CTA not done due to contrast allergy( plan to premedicate and get CTA). MRI cannot be done due to pacemaker  Continue with Aspirin and anticoagulation for stroke prevention in the setting of Afib. Continue Lipitor  Permissive BP to 220 systolic for 24 hrs from symptom onset and after that normalize BP  PT OT  Speech therapy  DVT prophylaxis with chemo/SCD prophylaxis  Discussed lifestyle modifications as prophylactic measures for stroke prevention " including adequate blood pressure management, healthy diet and regular exercise.         Thank you kindly for including us in the care of this patient. Please do not hesitate to contact us with any questions.             Jake Stevens MD  Neurology/vascular Neurology  Date of Service: 08/27/2023  9:49 AM    --------------------------------------------------------------------------------------------------------------------------------------------------------------------------------------------------------------------------------------------------------------  Please note: This note was transcribed using voice recognition software. Because of this technology there are often uinintended grammatical, spelling, and other transcription errors. Please disregard these errors.

## 2023-08-27 NOTE — PLAN OF CARE
Problem: Physical Therapy  Goal: Physical Therapy Goal  Description: Goals to be met by: 9/15/2023     Patient will increase functional independence with mobility by performin). Supine to sit with Modified [place]  2). Sit to supine with Modified [place]  3). Sit to stand transfer with Modified [place]  4). Gait  x > 100 feet with Modified [place]     Outcome: Ongoing, Progressing

## 2023-08-27 NOTE — ASSESSMENT & PLAN NOTE
" Patient is chronically on statin.will continue for now. Monitor clinically. Last LDL was No results found for: "LDLCALC"       "

## 2023-08-27 NOTE — HPI
"Min Castro is an 83 year old male with a past medical history of CAD with CABG x 3, HLD, pacemaker and carotid stenosis, who presents as a transfer from [facility] to [facility] for neurology services.   Per  (Dr. Maurer):  "Mr. Castro is an 84yo man with a past medical history of CAD, CABG x 3, Dressler's post CABG, asbestosis, HLD, pacemaker, and bilateral carotid artery stenosis.    Per sending MD, "here with medics who were called because the patient developed acute left-sided weakness and slurred speech.  Last known normal 15:50.  Patient reports he was working outside on a fence when he felt a little funny and had family and friends  on his left-sided weakness.  No history of stroke.  Patient does report having 1 beer earlier today while outside working.  No focal pain currently.  No nausea or vomiting."    In the ED his VS are BP (!) 127/49   Pulse 65   Temp 97.8 °F (36.6 °C)   Resp 16   Ht 5' 7" (1.702 m)   Wt 75.8 kg (167 lb)   SpO2 96%   BMI 26.16 kg/m².    VS since arrival are:  Temp:  [97.8 °F (36.6 °C)] 97.8 °F (36.6 °C)  Pulse:  [63-65] 65  Resp:  [16-20] 16  SpO2:  [96 %-98 %] 96 %  BP: (127-148)/(49-76) 127/49     Labs showed:  HG 12.5, , INR 1.2  CR 2, K 4, CA 8.6, ALB 3.6, NML LFT  ETOH < 10, UA NEG  No old labs for comparison    NC CT HEAD:  Ventricles and sulci are normal in size for age without evidence of hydrocephalus.   No extra-axial blood or fluid collections.  The brain parenchyma appears normal.   No parenchymal mass, hemorrhage, edema or major vascular distribution infarct.    Skull/extracranial contents (limited evaluation): No fracture.   Mastoid air cells and paranasal sinuses are essentially clear.     Impression:   No acute abnormality.    In the ED he was treated with:  Medications  sodium chloride 0.9% bolus 500 mL 500 mL (500 mLs Intravenous New Bag 8/26/23 3604)  0.9%  NaCl infusion (has no administration in time range)  0.9%  " "NaCl infusion (0 mLs Intravenous Stopped 8/26/23 1801)  aspirin tablet 325 mg (325 mg Oral Given 8/26/23 1658)    Per tele-Neuro stroke consult by Dr. Acuna:  TIA (transient ischemic attack)  Majority of symptoms are now resolved (Left face, dysarthria)  Some subtle e/o sensory extension on left which is not 100% present on multiple exams.  Plan on stat CTA @ spoke to r/o high risk vascular lesion requiring intervention or further monitoring and transfer.  Load aspirin 325 mg & clopidogrel 300 mg x 1 now, followed by daily aspirin 81 mg /  clopidogrel 75 mg x 30 days followed by monotherapy therafter  Recommend:  - MRI brain w/o contrast to evaluate for presence and characterization of infarct  - TTE   - lipid profile and A1c for evaluation of modifiable risk factors  - PT/OT/SLP eval and Rx  - Permissive HTN < 220/120 mmHg x48-72h pending results of vessel imaging    ADDENDUM:  On further review, patient noted to have pacemaker in place, so he cannot have MRI at [facility].  His EKG shows atrial fibrillation, a diagnosis not mentioned in Care Everywhere.  I reached back out to Neuro on call, Dr. Silva.  He felt not being able to get MRI would not  with an NIH stroke scale of 1, so he was okay with the patient going to a [facility] hospital and not performing MRI.  Regarding his Afib, Dr. Silva recommended bridging with ASA 325mg and Plavix 300mg as noted above, until formal Neuro and Cards consults could be obtained tomorrow at [place]."    Patient arrived to [facility] without incident. He states that he feels much better now and denies any complaint. He states that at home, he didn't realize anything was happening to him at first, but his family noticed his face become distorted with a left sided facial droop. He states he felt like he couldn't get his thoughts together and was having difficulty expressing himself verbally, then noticed his speech was slurred. All of those symptoms have since " resolved. He states he recently had bilateral carotid ultrasounds and an echo performed, but it is not able to be accessed in [facility]. Hospital medicine consulted for admission and further management.

## 2023-08-27 NOTE — H&P
"[facility] Medicine  History & Physical    Patient Name: Min Castro  MRN: [id number]  Patient Class: OP- Observation  Admission Date: 8/27/2023  Attending Physician: Melva Toscano MD   Primary Care Provider: Karissa, Primary Doctor         Patient information was obtained from patient, past medical records and ER records.     Subjective:     Principal Problem:TIA (transient ischemic attack)    Chief Complaint: No chief complaint on file.       HPI: Min Castro is an 83 year old male with a past medical history of CAD with CABG x 3, HLD, pacemaker and carotid stenosis, who presents as a transfer from [facility] to [facility] for neurology services.   Per  (Dr. Maurer):  "Mr. Castro is an 84yo man with a past medical history of CAD, CABG x 3, Dressler's post CABG, asbestosis, HLD, pacemaker, and bilateral carotid artery stenosis.    Per sending MD, "here with medics who were called because the patient developed acute left-sided weakness and slurred speech.  Last known normal 15:50.  Patient reports he was working outside on a fence when he felt a little funny and had family and friends  on his left-sided weakness.  No history of stroke.  Patient does report having 1 beer earlier today while outside working.  No focal pain currently.  No nausea or vomiting."    In the ED his VS are BP (!) 127/49   Pulse 65   Temp 97.8 °F (36.6 °C)   Resp 16   Ht 5' 7" (1.702 m)   Wt 75.8 kg (167 lb)   SpO2 96%   BMI 26.16 kg/m².    VS since arrival are:  Temp:  [97.8 °F (36.6 °C)] 97.8 °F (36.6 °C)  Pulse:  [63-65] 65  Resp:  [16-20] 16  SpO2:  [96 %-98 %] 96 %  BP: (127-148)/(49-76) 127/49     Labs showed:  HG 12.5, , INR 1.2  CR 2, K 4, CA 8.6, ALB 3.6, NML LFT  ETOH < 10, UA NEG  No old labs for comparison    NC CT HEAD:  Ventricles and sulci are normal in size for age without evidence of hydrocephalus.   No extra-axial blood or fluid collections.  The " brain parenchyma appears normal.   No parenchymal mass, hemorrhage, edema or major vascular distribution infarct.    Skull/extracranial contents (limited evaluation): No fracture.   Mastoid air cells and paranasal sinuses are essentially clear.     Impression:   No acute abnormality.    In the ED he was treated with:  Medications  sodium chloride 0.9% bolus 500 mL 500 mL (500 mLs Intravenous New Bag 8/26/23 1724)  0.9%  NaCl infusion (has no administration in time range)  0.9%  NaCl infusion (0 mLs Intravenous Stopped 8/26/23 1801)  aspirin tablet 325 mg (325 mg Oral Given 8/26/23 1658)    Per tele-Neuro stroke consult by Dr. Acuna:  TIA (transient ischemic attack)  Majority of symptoms are now resolved (Left face, dysarthria)  Some subtle e/o sensory extension on left which is not 100% present on multiple exams.  Plan on stat CTA @ spoke to r/o high risk vascular lesion requiring intervention or further monitoring and transfer.  Load aspirin 325 mg & clopidogrel 300 mg x 1 now, followed by daily aspirin 81 mg /  clopidogrel 75 mg x 30 days followed by monotherapy therafter  Recommend:  - MRI brain w/o contrast to evaluate for presence and characterization of infarct  - TTE   - lipid profile and A1c for evaluation of modifiable risk factors  - PT/OT/SLP eval and Rx  - Permissive HTN < 220/120 mmHg x48-72h pending results of vessel imaging    ADDENDUM:  On further review, patient noted to have pacemaker in place, so he cannot have MRI at [facility].  His EKG shows atrial fibrillation, a diagnosis not mentioned in Care Everywhere.  I reached back out to Neuro on call, Dr. Silva.  He felt not being able to get MRI would not  with an NIH stroke scale of 1, so he was okay with the patient going to a [facility] hospital and not performing MRI.  Regarding his Afib, Dr. Silva recommended bridging with ASA 325mg and Plavix 300mg as noted above, until formal Neuro and Cards consults could be obtained  "tomorrow at [place]."    Patient arrived to [facility] without incident. He states that he feels much better now and denies any complaint. He states that at home, he didn't realize anything was happening to him at first, but his family noticed his face become distorted with a left sided facial droop. He states he felt like he couldn't get his thoughts together and was having difficulty expressing himself verbally, then noticed his speech was slurred. All of those symptoms have since resolved. He states he recently had bilateral carotid ultrasounds and an echo performed, but it is not able to be accessed in [facility]. [facility] medicine consulted for admission and further management.      Past Medical History:   Diagnosis Date    Coronary artery disease     Hypertension        History reviewed. No pertinent surgical history.    Review of patient's allergies indicates:   Allergen Reactions    Iodinated contrast media     Contrast media Other (See Comments)       Current Facility-Administered Medications on File Prior to Encounter   Medication    [COMPLETED] 0.9%  NaCl infusion    [COMPLETED] 0.9%  NaCl infusion    [COMPLETED] aspirin tablet 325 mg    [COMPLETED] clopidogreL tablet 300 mg    [COMPLETED] HYDROcodone-acetaminophen  mg per tablet 1 tablet    [COMPLETED] sodium chloride 0.9% bolus 500 mL 500 mL     Current Outpatient Medications on File Prior to Encounter   Medication Sig    ALPRAZolam (XANAX XR) 1 MG Tb24 Take by mouth once daily.    atorvastatin (LIPITOR) 40 MG tablet Take 40 mg by mouth once daily.    b complex vitamins capsule Take 1 capsule by mouth once daily.    diphenhydrAMINE-acetaminophen (TYLENOL PM)  mg Tab Take 1 tablet by mouth nightly as needed.    metoprolol tartrate (LOPRESSOR) 25 MG tablet Take 25 mg by mouth 2 (two) times daily.    pantoprazole (PROTONIX) 40 MG tablet Take 40 mg by mouth once daily.    valsartan (DIOVAN) 320 MG tablet Take 320 mg by mouth once daily.    " levoFLOXacin (LEVAQUIN) 250 MG tablet Take 250 mg by mouth once daily.     Family History    Family history is unknown by patient.       Tobacco Use    Smoking status: Never    Smokeless tobacco: Never   Substance and Sexual Activity    Alcohol use: Yes     Comment: occasional    Drug use: Never    Sexual activity: Not Currently     Review of Systems   Constitutional:  Negative for chills and fever.   HENT:  Negative for congestion and sore throat.    Eyes:  Negative for visual disturbance.   Respiratory:  Negative for cough and shortness of breath.    Cardiovascular:  Negative for chest pain and palpitations.   Gastrointestinal:  Negative for abdominal pain, constipation, diarrhea, nausea and vomiting.   Endocrine: Negative for cold intolerance and heat intolerance.   Genitourinary:  Negative for dysuria and hematuria.   Musculoskeletal:  Negative for arthralgias and myalgias.   Skin:  Negative for rash.   Neurological:  Positive for facial asymmetry and speech difficulty. Negative for tremors and seizures.   Hematological:  Negative for adenopathy. Does not bruise/bleed easily.   All other systems reviewed and are negative.    Objective:     Vital Signs (Most Recent):  Temp: 98.4 °F (36.9 °C) (08/27/23 0542)  Pulse: 64 (08/27/23 0542)  Resp: (!) 26 (08/27/23 0542)  BP: (!) 211/89 (08/27/23 0542)  SpO2: 95 % (08/27/23 0542) Vital Signs (24h Range):  Temp:  [97.8 °F (36.6 °C)-98.7 °F (37.1 °C)] 98.4 °F (36.9 °C)  Pulse:  [50-71] 64  Resp:  [0-26] 26  SpO2:  [90 %-98 %] 95 %  BP: (101-211)/(49-90) 211/89     Weight: 76.6 kg (168 lb 14 oz)  Body mass index is 26.45 kg/m².     Physical Exam  Vitals and nursing note reviewed.   Constitutional:       General: He is awake. He is not in acute distress.     Appearance: Normal appearance. He is well-developed. He is not ill-appearing.   HENT:      Head: Normocephalic and atraumatic.      Nose: Nose normal. No septal deviation.   Eyes:      Conjunctiva/sclera:  "Conjunctivae normal.      Pupils: Pupils are equal, round, and reactive to light.   Neck:      Thyroid: No thyroid mass.      Vascular: No JVD.      Trachea: No tracheal tenderness or tracheal deviation.   Cardiovascular:      Rate and Rhythm: Normal rate and regular rhythm.      Heart sounds: Normal heart sounds, S1 normal and S2 normal. No murmur heard.     No friction rub. No gallop.   Pulmonary:      Effort: Pulmonary effort is normal.      Breath sounds: Normal breath sounds. No decreased breath sounds, wheezing, rhonchi or rales.   Abdominal:      General: Bowel sounds are normal. There is no distension.      Palpations: Abdomen is soft. There is no hepatomegaly, splenomegaly or mass.      Tenderness: There is no abdominal tenderness.   Skin:     General: Skin is warm.      Findings: No rash.   Neurological:      General: No focal deficit present.      Mental Status: He is alert and oriented to person, place, and time. Mental status is at baseline.      GCS: GCS eye subscore is 4. GCS verbal subscore is 5. GCS motor subscore is 6.      Cranial Nerves: Cranial nerves 2-12 are intact. No cranial nerve deficit.      Sensory: Sensation is intact. No sensory deficit.      Motor: Motor function is intact.   Psychiatric:         Mood and Affect: Mood normal.         Behavior: Behavior normal. Behavior is cooperative.              CRANIAL NERVES     CN III, IV, VI   Pupils are equal, round, and reactive to light.       Significant Labs: All pertinent labs within the past 24 hours have been reviewed.  A1C: No results for input(s): "HGBA1C" in the last 4320 hours.  CBC:   Recent Labs   Lab 08/26/23  1650   WBC 5.39   HGB 12.5*   HCT 39.2*   *     CMP:   Recent Labs   Lab 08/26/23  1650      K 4.0      CO2 20*   GLU 99   BUN 31*   CREATININE 2.0*   CALCIUM 8.6*   PROT 7.3   ALBUMIN 3.6   BILITOT 0.7   ALKPHOS 97   AST 15   ALT 13   ANIONGAP 11     Coagulation:   Recent Labs   Lab 08/27/23  0516   INR " 1.1   APTT 21.9       Lipid Panel:   Recent Labs   Lab 08/27/23  0516   CHOL 110*   HDL 41   LDLCALC 56.0*   TRIG 65   CHOLHDL 37.3     Magnesium:   Recent Labs   Lab 08/26/23  1650   MG 2.0       TSH:   Recent Labs   Lab 08/27/23  0516   TSH 2.338     Urine Studies:   Recent Labs   Lab 08/26/23  1753   COLORU Yellow   APPEARANCEUA Clear   PHUR 6.0   SPECGRAV 1.020   PROTEINUA 1+*   GLUCUA Negative   KETONESU Trace*   BILIRUBINUA Negative   OCCULTUA Negative   NITRITE Negative   UROBILINOGEN 1.0   LEUKOCYTESUR Negative   RBCUA 0   WBCUA 0   BACTERIA Rare   SQUAMEPITHEL occasional   HYALINECASTS occasional*       Significant Imaging: I have reviewed all pertinent imaging results/findings within the past 24 hours.  CT head: No acute abnormality      Assessment/Plan:     * TIA (transient ischemic attack)  Admit to obs  Antithrombotics for secondary stroke prevention: Antiplatelets: Aspirin: 81 mg daily  Clopidogrel: 300 mg loading dose x 1, now  Clopidogrel: 75 mg daily    Statins for secondary stroke prevention and hyperlipidemia, if present:   Statins: Atorvastatin- 40 mg daily    Aggressive risk factor modification: HTN, HLD, Obesity, A-Fib, CAD     Rehab efforts: The patient has been evaluated by a stroke team provider and the therapy needs have been fully considered based off the presenting complaints and exam findings. The following therapy evaluations are needed: PT evaluate and treat, OT evaluate and treat, SLP evaluate and treat    Diagnostics ordered/pending: Carotid ultrasound to assess vasculature, CT scan of head without contrast to asses brain parenchyma, HgbA1C to assess blood glucose levels, Lipid Profile to assess cholesterol levels, TTE to assess cardiac function/status , TSH to assess thyroid function    VTE prophylaxis: Mechanical prophylaxis: Place SCDs    BP parameters: TIA: SBP <220 until imaging confirmation of no infarct         Thrombocytopenia  Platelets on admit 135  Anticoagulation held per  "neuro orders  Trend CBC      Anemia  Patient's anemia is currently controlled. Has not received any PRBCs to date.. Etiology likely d/t chronic disease  Current CBC reviewed-   Lab Results   Component Value Date    HGB 12.5 (L) 08/26/2023    HCT 39.2 (L) 08/26/2023     Monitor serial CBC and transfuse if patient becomes hemodynamically unstable, symptomatic or H/H drops below 7/21.         VANESSA (acute kidney injury)  Patient with acute kidney injury/acute renal failure. VANESSA is currently stable. Baseline creatinine unknown - Labs reviewed- Renal function/electrolytes with CrCl cannot be calculated (Unknown ideal weight.). according to latest data. Monitor urine output and serial BMP and adjust therapy as needed. Avoid nephrotoxins and renally dose meds for GFR listed above.    S/P CABG x 3  Noted, on tele      Pacemaker  Noted, on tele      Mixed hyperlipidemia   Patient is chronically on statin.will continue for now. Monitor clinically. Last LDL was No results found for: "LDLCALC"         Longstanding persistent atrial fibrillation  Patient with Persistent (7 days or more) atrial fibrillation which is controlled currently with Beta Blocker. Patient is currently in atrial fibrillation.[id number] Score: 3. Anticoagulation held per neuro, resume when appropriate    Hypertension  Chronic, controlled.  Latest blood pressure and vitals reviewed-     Temp:  [97.8 °F (36.6 °C)]   Pulse:  [50-65]   Resp:  [0-21]   BP: (101-176)/(49-82)   SpO2:  [90 %-98 %] .   Home meds for hypertension were reviewed and noted below-  Hypertension Medications             metoprolol tartrate (LOPRESSOR) 25 MG tablet Take 25 mg by mouth 2 (two) times daily.    valsartan (DIOVAN) 320 MG tablet Take 320 mg by mouth once daily.          While in the hospital, will manage blood pressure as follows; Adjust home antihypertensive regimen as follows- hold for permissive hypertension    Will utilize p.r.n. blood pressure medication only if patient's " blood pressure greater than 220/110 and he develops symptoms such as worsening chest pain or shortness of breath.        Gastroesophageal reflux disease  Noted, chronic  ppi daily      Bilateral carotid artery occlusion  Noted, chronic  Pending US bilateral carotids      Coronary arteriosclerosis in native artery  Patient with known CAD s/p CABG, which is controlled Will continue ASA, Plavix and Statin and monitor for S/Sx of angina/ACS. Continue to monitor on telemetry.           VTE Risk Mitigation (From admission, onward)         Ordered     IP VTE HIGH RISK PATIENT  Once         08/27/23 0016     Place sequential compression device  Until discontinued         08/27/23 0016     Reason for No Pharmacological VTE Prophylaxis  Once        Question:  Reasons:  Answer:  Already adequately anticoagulated on oral Anticoagulants    08/27/23 0016                     MARIANA Bhatti  Department of Hospital Medicine  [facility]/Surg

## 2023-08-27 NOTE — PT/OT/SLP EVAL
Physical Therapy Evaluation    Patient Name:  Min Castro   MRN:  [id number]    Recommendations:     Discharge Recommendations: home health PT   Discharge Equipment Recommendations: none (has cane and walker)     Assessment:     Min Castro is a 83 y.o. male admitted with a medical diagnosis of TIA (transient ischemic attack).  He presents with the following impairments/functional limitations: weakness, impaired endurance, impaired balance, gait instability, impaired functional mobility, decreased lower extremity function, impaired cardiopulmonary response to activity  .    Rehab Prognosis: Good; patient would benefit from acute skilled PT services to address these deficits and reach maximum level of function.    Recent Surgery: * No surgery found *      Plan:     During this hospitalization, patient to be seen 6 x/week to address the identified rehab impairments via gait training, therapeutic activities, therapeutic exercises and progress toward the following goals:    Plan of Care Expires:  09/15/23    Subjective     Patient/Family Comments/goals: agrees to work with PT    Living Environment:  House with spouse, 5-6 steps (with rail) to enter)  Prior to admission, patients level of function was independent without AD, limited endurance, + driving.  Equipment used at home: none.  DME owned (not currently used): single point cane and rollator .  Upon discharge, patient will have assistance from family.    Objective:     Communicated with nurse (Sarina) prior to session.  Patient found HOB elevated with bed alarm, telemetry  upon PT entry to room.    General Precautions: Standard, fall, pacemaker  Orthopedic Precautions:N/A   Braces: N/A  Respiratory Status: Room air    Functional Mobility training:  Bed Mobility:     Rolling Right: stand by assistance  Supine to Sit: stand by assistance  Transfers:     Sit to Stand:  contact guard assistance with rolling walker  Gait: 80' with RW, 15' with hand rail, 25'  without AD with CGA and cues      AM-PAC 6 CLICK MOBILITY  Total Score:18       Treatment & Education:  Mobility training as above with cues for technique  SEATED: LAQ, ankle DF/PF, x 10 reps each (rec'd to perform few times/day)    Patient left up in chair with all lines intact, call button in reach, and chair alarm on.    GOALS:   Multidisciplinary Problems       Physical Therapy Goals          Problem: Physical Therapy    Goal Priority Disciplines Outcome Goal Variances Interventions   Physical Therapy Goal     PT, PT/OT Ongoing, Progressing     Description: Goals to be met by: 9/15/2023     Patient will increase functional independence with mobility by performin). Supine to sit with Modified [place]  2). Sit to supine with Modified [place]  3). Sit to stand transfer with Modified [place]  4). Gait  x > 100 feet with Modified [place]                          History:     Past Medical History:   Diagnosis Date    Coronary artery disease     Hypertension        History reviewed. No pertinent surgical history.    Time Tracking:     PT Received On: 23  PT Start Time: 901     PT Stop Time: 921  PT Total Time (min): 20 min     Billable Minutes: Evaluation 10 and Therapeutic Activity 10      2023

## 2023-08-27 NOTE — PLAN OF CARE
Goals to be met by: 9/10/2023     Patient will increase functional independence with ADLs by performing:    UE Dressing with Modified [place].  LE Dressing with Modified [place].  Grooming while standing with Modified [place].  Toileting from toilet with Modified [place] for hygiene and clothing management.   Toilet transfer to toilet with Modified [place].    OT POC initiated and established.

## 2023-08-27 NOTE — ASSESSMENT & PLAN NOTE
Chronic, controlled.  Latest blood pressure and vitals reviewed-     Temp:  [97.8 °F (36.6 °C)]   Pulse:  [50-65]   Resp:  [0-21]   BP: (101-176)/(49-82)   SpO2:  [90 %-98 %] .   Home meds for hypertension were reviewed and noted below-  Hypertension Medications             metoprolol tartrate (LOPRESSOR) 25 MG tablet Take 25 mg by mouth 2 (two) times daily.    valsartan (DIOVAN) 320 MG tablet Take 320 mg by mouth once daily.          While in the hospital, will manage blood pressure as follows; Adjust home antihypertensive regimen as follows- hold for permissive hypertension    Will utilize p.r.n. blood pressure medication only if patient's blood pressure greater than 220/110 and he develops symptoms such as worsening chest pain or shortness of breath.

## 2023-08-27 NOTE — ED NOTES
Patient only c/o tension h/a at this time in which he states he gets often. Patient medicated per order. Patient now asymptomatic. See neuro assessment. All symptoms have resolved. Remains AAOx4. Vitally stable other than bradycardia. Denies any other complaints.

## 2023-08-27 NOTE — ASSESSMENT & PLAN NOTE
Admit to obs  Antithrombotics for secondary stroke prevention: Antiplatelets: Aspirin: 81 mg daily  Clopidogrel: 300 mg loading dose x 1, now  Clopidogrel: 75 mg daily    Statins for secondary stroke prevention and hyperlipidemia, if present:   Statins: Atorvastatin- 40 mg daily    Aggressive risk factor modification: HTN, HLD, Obesity, A-Fib, CAD     Rehab efforts: The patient has been evaluated by a stroke team provider and the therapy needs have been fully considered based off the presenting complaints and exam findings. The following therapy evaluations are needed: PT evaluate and treat, OT evaluate and treat, SLP evaluate and treat    Diagnostics ordered/pending: Carotid ultrasound to assess vasculature, CT scan of head without contrast to asses brain parenchyma, HgbA1C to assess blood glucose levels, Lipid Profile to assess cholesterol levels, TTE to assess cardiac function/status , TSH to assess thyroid function    VTE prophylaxis: Mechanical prophylaxis: Place SCDs    BP parameters: TIA: SBP <220 until imaging confirmation of no infarct

## 2023-08-27 NOTE — ASSESSMENT & PLAN NOTE
Patient with acute kidney injury/acute renal failure. VANESSA is currently stable. Baseline creatinine unknown - Labs reviewed- Renal function/electrolytes with CrCl cannot be calculated (Unknown ideal weight.). according to latest data. Monitor urine output and serial BMP and adjust therapy as needed. Avoid nephrotoxins and renally dose meds for GFR listed above.

## 2023-08-27 NOTE — ASSESSMENT & PLAN NOTE
Patient's anemia is currently controlled. Has not received any PRBCs to date.. Etiology likely d/t chronic disease  Current CBC reviewed-   Lab Results   Component Value Date    HGB 12.5 (L) 08/26/2023    HCT 39.2 (L) 08/26/2023     Monitor serial CBC and transfuse if patient becomes hemodynamically unstable, symptomatic or H/H drops below 7/21.

## 2023-08-27 NOTE — PLAN OF CARE
08/27/23 1436   DÍAZ Message   Medicare Outpatient and Observation Notification regarding financial responsibility Explained to patient/caregiver;Signed/date by patient/caregiver   Date DÍAZ was signed 08/27/23   Time DÍAZ was signed 1400

## 2023-08-27 NOTE — PT/OT/SLP EVAL
Occupational Therapy Evaluation and Treatment    Name: Min Castro  MRN: [id number]  Admitting Diagnosis: TIA (transient ischemic attack)  Recent Surgery: * No surgery found *      Recommendations:     Discharge Recommendations: home, outpatient OT  Level of Assistance Recommended: Intermittent assistance and Intermittent supervision  Discharge Equipment Recommendations: shower chair, bedside commode, other (see comments) (shower chair OR bedside commode; patient also reports he plans to have grab bars installed in walk-in shower)  Barriers to discharge: None    Assessment:     Min Castro is a 83 y.o. male with a medical diagnosis of TIA (transient ischemic attack). past medical history of CAD with CABG x 3, HLD, pacemaker and carotid stenosis, who presents as a transfer from [facility] to [facility] for neurology services.     Patient found up in chair and agreeable to participate in OT evaluation/treatment session. Patient oriented x 4; patient reports acute symptoms of L facial droop and slurred speech nearly all resolved. Patient reports he notices he slurred a few words during PT evaluation, but feels it may be so slight that it is possibly imperceptible to others; OTR encouraging patient to continue to take note of symptoms and report whether they worsen. Patient verbalizes understanding and in agreement.     Patient demonstrates bilateral upper extremity ROM WNL and strength 4+/5 to 5/5. Patient demonstrates mild fine motor coordination deficits of L UE. CGA sit<>stand, CGA chair>bed transfer; SBA LB dressing from bedside chair; Set-up of meal tray and Supervision/Independent to perform feeding tasks from bedside chair. He presents with performance deficits affecting function including weakness, impaired endurance, impaired self care skills, impaired functional mobility, gait instability, decreased upper extremity function, decreased lower extremity function, decreased safety  awareness, impaired coordination, impaired cardiopulmonary response to activity. Patient returning to bed (CGA without AD) at end of session as sonographer present for carotid ultrasound.    Patient reports PLOF of Modified Independent to Independent and lives with spouse in single story home with 5-6 steps to enter with grab bar. Recommend home with spouse and home health OT services.    Rehab Prognosis: Good; patient would benefit from acute OT services to address these deficits and reach maximum level of function.    Plan:     Patient to be seen 3 x/week to address the above listed problems via self-care/home management, therapeutic activities, therapeutic exercises  Plan of Care Expires: 09/10/23  Plan of Care Reviewed with: patient    Subjective     Chief Complaint: None  Patient Comments/Goals: To have his last test performed  Pain/Comfort:  Pain Rating 1: 0/10    Patients cultural, spiritual, Sabianist conflicts given the current situation: no    Social History:  Living Environment: Patient  lives with spouse in single story home with 5-6 steps to enter and hand rail; walk-in shower   Prior Level of Function: Prior to admission, patient  was Modified Independent to Independent with use of single point cane as needed  Roles and Routines: Patient was not driving prior to admission.  Equipment Used at Home: cane, straight, walker, rolling, nebulizer  DME owned (not currently used): none  Assistance Upon Discharge:  Spouse and recommend home health OT services for safety evaluation    Objective:     Communicated with NurseSarina, prior to session. Patient found up in chair with chair check, telemetry upon OT entry to room.    General Precautions: Standard, fall, pacemaker   Orthopedic Precautions: N/A   Braces: N/A    Respiratory Status: Room air    Occupational Performance    Bed Mobility:   Scooting to HOB in supine: stand by assistance  Sit to Supine with stand by assistance on left side of  bed    Functional Mobility/Transfers:  Sit <> Stand Transfer with contact guard assistance with no AD  Bed <> Chair Transfer using Stand Pivot technique with contact guard assistance with no AD  Functional Mobility: Ambulating a couple feet with CGA without AD bedside chair>EOB    Activities of Daily Living:  Feeding: independence and set up assistance  Grooming: set up assistance  Upper Body Dressing: supervision  Lower Body Dressing: stand by assistance  Toileting: contact guard assistance    Cognitive/Visual Perceptual:  Cognitive/Psychosocial Skills:    -     Oriented to: Person, Place, Time, Situation  -     Follows Commands/attention: Follows multistep  commands  -     Communication: clear/fluent  -     Safety awareness/insight to disability: impaired  -     Mood/Affect/Coping skills/emotional control: Appropriate to situation    Physical Exam:  Balance:    -     Sitting: independence and supervision  -     Standing: stand by assistance  Dominant hand: Right  Upper Extremity Range of Motion:     -       Right Upper Extremity: WNL  -       Left Upper Extremity: WNL  Upper Extremity Strength:    -       Right Upper Extremity: 4+/5 to 5/5  -       Left Upper Extremity: 4+/5 to 5/5   Strength:    -       Right Upper Extremity: WNL  -       Left Upper Extremity: WNL  Fine Motor Coordination:    -       Impaired  Left hand, finger to nose   and Left hand thumb/finger opposition skills    Gross motor coordination:   Mild impairment    AMPAC 6 Click ADL:  AMPAC Total Score: 18    Treatment & Education:  Therapist provided facilitation and instruction of proper body mechanics, energy conservation, and fall prevention strategies during tasks listed above  Patient educated on role of OT, POC, and goals for therapy  Patient educated on importance of OOB activities with staff member assistance and sitting OOB majority of the day  OTR initiating discharge planning - recommend home with spouse and home health therapy  services     Patient left HOB elevated with all lines intact, call button in reach, RN notified, bed alarm on, and sonographer present.    GOALS:   Multidisciplinary Problems       Occupational Therapy Goals          Problem: Occupational Therapy    Goal Priority Disciplines Outcome Interventions   Occupational Therapy Goal     OT, PT/OT     Description: Goals to be met by: 9/10/2023     Patient will increase functional independence with ADLs by performing:    UE Dressing with Modified [place].  LE Dressing with Modified [place].  Grooming while standing with Modified [place].  Toileting from toilet with Modified [place] for hygiene and clothing management.   Toilet transfer to toilet with Modified [place].                         History:     Past Medical History:   Diagnosis Date    Coronary artery disease     Hypertension        History reviewed. No pertinent surgical history.    Time Tracking:     OT Date of Treatment: 08/27/23  OT Start Time: 1118  OT Stop Time: 1138  OT Total Time (min): 20 min    Billable Minutes: Evaluation 10 and Self Care/Home Management 10    8/27/2023

## 2023-08-27 NOTE — ASSESSMENT & PLAN NOTE
Patient with Persistent (7 days or more) atrial fibrillation which is controlled currently with Beta Blocker. Patient is currently in atrial fibrillation.[id number] Score: 3. Anticoagulation held per neuro, resume when appropriate

## 2023-08-27 NOTE — PLAN OF CARE
Pt arrived to the floor just before 0010 on 8/27/23, pt oriented to room, how to use the call light, and purpose of bed alarm being on. Reviewed PoC with pt, they verbalized understanding and willingness to participate. VSS, NAD at this time, no c/o pain, will continue to monitor.

## 2023-08-27 NOTE — PLAN OF CARE
[facility]  Initial Discharge Assessment       Primary Care Provider: No, Primary Doctor    Admission Diagnosis: TIA (transient ischemic attack) [G45.9]    Admission Date: 8/27/2023  Expected Discharge Date:     DC assessment completed with pt at bedside. Verified info on facesheet as correct. Lives with spouse. Pt is independent with ADL's, drives. PCP Ray Galdamez. Pharmacy [facility]. DME listed. Denies hd/dm/hh. Pt does not take coumadin. Pt without recent admission. Family to provide ride home. CM to follow for discharge needs.      Transition of Care Barriers: None    Payor: HU[employer] MANAGED MEDICARE / Plan: HU[employer] MEDICARE PPO / Product Type: Medicare Advantage /     Extended Emergency Contact Information  Primary Emergency Contact: Brittney Castro  Address: [address] [place] [address] [facility]  Home Phone: [phone]  Mobile Phone: [phone]  Relation: Spouse  Preferred language: English   needed? No    Discharge Plan A: Home with family  Discharge Plan B: Home      [facility] - [place], [place] - [address]  [address] [place] [address]  Phone: [phone] Fax: [phone]      Initial Assessment (most recent)       Adult Discharge Assessment - 08/27/23 1144          Discharge Assessment    Assessment Type Discharge Planning Assessment     Confirmed/corrected address, phone number and insurance Yes     Confirmed Demographics Correct on Facesheet     Source of Information patient     People in Home spouse     Do you expect to return to your current living situation? Yes     Prior to hospitilization cognitive status: Alert/Oriented     Current cognitive status: Alert/Oriented     Equipment Currently Used at Home nebulizer;grab bar;cane, straight;walker, rolling     Readmission within 30 days? No     Patient currently being followed by outpatient case management? No     Do you currently have service(s) that  help you manage your care at home? No     Do you take prescription medications? Yes     Do you have prescription coverage? Yes     Do you have any problems affording any of your prescribed medications? No     Is the patient taking medications as prescribed? yes     How do you get to doctors appointments? family or friend will provide;car, drives self     Are you on dialysis? No     Do you take coumadin? No     DME Needed Upon Discharge  none     Discharge Plan discussed with: Patient     Transition of Care Barriers None     Discharge Plan A Home with family     Discharge Plan B Home

## 2023-08-27 NOTE — SUBJECTIVE & OBJECTIVE
Past Medical History:   Diagnosis Date    Coronary artery disease     Hypertension        History reviewed. No pertinent surgical history.    Review of patient's allergies indicates:   Allergen Reactions    Iodinated contrast media     Contrast media Other (See Comments)       Current Facility-Administered Medications on File Prior to Encounter   Medication    [COMPLETED] 0.9%  NaCl infusion    [COMPLETED] 0.9%  NaCl infusion    [COMPLETED] aspirin tablet 325 mg    [COMPLETED] clopidogreL tablet 300 mg    [COMPLETED] HYDROcodone-acetaminophen  mg per tablet 1 tablet    [COMPLETED] sodium chloride 0.9% bolus 500 mL 500 mL     Current Outpatient Medications on File Prior to Encounter   Medication Sig    ALPRAZolam (XANAX XR) 1 MG Tb24 Take by mouth once daily.    atorvastatin (LIPITOR) 40 MG tablet Take 40 mg by mouth once daily.    b complex vitamins capsule Take 1 capsule by mouth once daily.    diphenhydrAMINE-acetaminophen (TYLENOL PM)  mg Tab Take 1 tablet by mouth nightly as needed.    metoprolol tartrate (LOPRESSOR) 25 MG tablet Take 25 mg by mouth 2 (two) times daily.    pantoprazole (PROTONIX) 40 MG tablet Take 40 mg by mouth once daily.    valsartan (DIOVAN) 320 MG tablet Take 320 mg by mouth once daily.    levoFLOXacin (LEVAQUIN) 250 MG tablet Take 250 mg by mouth once daily.     Family History    Family history is unknown by patient.       Tobacco Use    Smoking status: Never    Smokeless tobacco: Never   Substance and Sexual Activity    Alcohol use: Yes     Comment: occasional    Drug use: Never    Sexual activity: Not Currently     Review of Systems   Constitutional:  Negative for chills and fever.   HENT:  Negative for congestion and sore throat.    Eyes:  Negative for visual disturbance.   Respiratory:  Negative for cough and shortness of breath.    Cardiovascular:  Negative for chest pain and palpitations.   Gastrointestinal:  Negative for abdominal pain, constipation, diarrhea, nausea and  vomiting.   Endocrine: Negative for cold intolerance and heat intolerance.   Genitourinary:  Negative for dysuria and hematuria.   Musculoskeletal:  Negative for arthralgias and myalgias.   Skin:  Negative for rash.   Neurological:  Positive for facial asymmetry and speech difficulty. Negative for tremors and seizures.   Hematological:  Negative for adenopathy. Does not bruise/bleed easily.   All other systems reviewed and are negative.    Objective:     Vital Signs (Most Recent):  Temp: 98.4 °F (36.9 °C) (08/27/23 0542)  Pulse: 64 (08/27/23 0542)  Resp: (!) 26 (08/27/23 0542)  BP: (!) 211/89 (08/27/23 0542)  SpO2: 95 % (08/27/23 0542) Vital Signs (24h Range):  Temp:  [97.8 °F (36.6 °C)-98.7 °F (37.1 °C)] 98.4 °F (36.9 °C)  Pulse:  [50-71] 64  Resp:  [0-26] 26  SpO2:  [90 %-98 %] 95 %  BP: (101-211)/(49-90) 211/89     Weight: 76.6 kg (168 lb 14 oz)  Body mass index is 26.45 kg/m².     Physical Exam  Vitals and nursing note reviewed.   Constitutional:       General: He is awake. He is not in acute distress.     Appearance: Normal appearance. He is well-developed. He is not ill-appearing.   HENT:      Head: Normocephalic and atraumatic.      Nose: Nose normal. No septal deviation.   Eyes:      Conjunctiva/sclera: Conjunctivae normal.      Pupils: Pupils are equal, round, and reactive to light.   Neck:      Thyroid: No thyroid mass.      Vascular: No JVD.      Trachea: No tracheal tenderness or tracheal deviation.   Cardiovascular:      Rate and Rhythm: Normal rate and regular rhythm.      Heart sounds: Normal heart sounds, S1 normal and S2 normal. No murmur heard.     No friction rub. No gallop.   Pulmonary:      Effort: Pulmonary effort is normal.      Breath sounds: Normal breath sounds. No decreased breath sounds, wheezing, rhonchi or rales.   Abdominal:      General: Bowel sounds are normal. There is no distension.      Palpations: Abdomen is soft. There is no hepatomegaly, splenomegaly or mass.      Tenderness:  "There is no abdominal tenderness.   Skin:     General: Skin is warm.      Findings: No rash.   Neurological:      General: No focal deficit present.      Mental Status: He is alert and oriented to person, place, and time. Mental status is at baseline.      GCS: GCS eye subscore is 4. GCS verbal subscore is 5. GCS motor subscore is 6.      Cranial Nerves: Cranial nerves 2-12 are intact. No cranial nerve deficit.      Sensory: Sensation is intact. No sensory deficit.      Motor: Motor function is intact.   Psychiatric:         Mood and Affect: Mood normal.         Behavior: Behavior normal. Behavior is cooperative.              CRANIAL NERVES     CN III, IV, VI   Pupils are equal, round, and reactive to light.       Significant Labs: All pertinent labs within the past 24 hours have been reviewed.  A1C: No results for input(s): "HGBA1C" in the last 4320 hours.  CBC:   Recent Labs   Lab 08/26/23  1650   WBC 5.39   HGB 12.5*   HCT 39.2*   *     CMP:   Recent Labs   Lab 08/26/23  1650      K 4.0      CO2 20*   GLU 99   BUN 31*   CREATININE 2.0*   CALCIUM 8.6*   PROT 7.3   ALBUMIN 3.6   BILITOT 0.7   ALKPHOS 97   AST 15   ALT 13   ANIONGAP 11     Coagulation:   Recent Labs   Lab 08/27/23  0516   INR 1.1   APTT 21.9       Lipid Panel:   Recent Labs   Lab 08/27/23  0516   CHOL 110*   HDL 41   LDLCALC 56.0*   TRIG 65   CHOLHDL 37.3     Magnesium:   Recent Labs   Lab 08/26/23  1650   MG 2.0       TSH:   Recent Labs   Lab 08/27/23  0516   TSH 2.338     Urine Studies:   Recent Labs   Lab 08/26/23  1753   COLORU Yellow   APPEARANCEUA Clear   PHUR 6.0   SPECGRAV 1.020   PROTEINUA 1+*   GLUCUA Negative   KETONESU Trace*   BILIRUBINUA Negative   OCCULTUA Negative   NITRITE Negative   UROBILINOGEN 1.0   LEUKOCYTESUR Negative   RBCUA 0   WBCUA 0   BACTERIA Rare   SQUAMEPITHEL occasional   HYALINECASTS occasional*       Significant Imaging: I have reviewed all pertinent imaging results/findings within the past 24 " hours.  CT head: No acute abnormality

## 2023-08-28 LAB
ALBUMIN SERPL BCP-MCNC: 3.5 G/DL (ref 3.5–5.2)
ALP SERPL-CCNC: 90 U/L (ref 55–135)
ALT SERPL W/O P-5'-P-CCNC: 12 U/L (ref 10–44)
ANION GAP SERPL CALC-SCNC: 11 MMOL/L (ref 8–16)
AST SERPL-CCNC: 13 U/L (ref 10–40)
BASOPHILS # BLD AUTO: 0 K/UL (ref 0–0.2)
BASOPHILS NFR BLD: 0 % (ref 0–1.9)
BILIRUB SERPL-MCNC: 0.7 MG/DL (ref 0.1–1)
BUN SERPL-MCNC: 24 MG/DL (ref 8–23)
CALCIUM SERPL-MCNC: 8.8 MG/DL (ref 8.7–10.5)
CHLORIDE SERPL-SCNC: 108 MMOL/L (ref 95–110)
CO2 SERPL-SCNC: 20 MMOL/L (ref 23–29)
CREAT SERPL-MCNC: 1.5 MG/DL (ref 0.5–1.4)
DIFFERENTIAL METHOD: ABNORMAL
EOSINOPHIL # BLD AUTO: 0 K/UL (ref 0–0.5)
EOSINOPHIL NFR BLD: 0 % (ref 0–8)
ERYTHROCYTE [DISTWIDTH] IN BLOOD BY AUTOMATED COUNT: 15.6 % (ref 11.5–14.5)
EST. GFR  (NO RACE VARIABLE): 46 ML/MIN/1.73 M^2
GLUCOSE SERPL-MCNC: 144 MG/DL (ref 70–110)
HCT VFR BLD AUTO: 40.9 % (ref 40–54)
HGB BLD-MCNC: 13.3 G/DL (ref 14–18)
IMM GRANULOCYTES # BLD AUTO: 0.03 K/UL (ref 0–0.04)
IMM GRANULOCYTES NFR BLD AUTO: 0.6 % (ref 0–0.5)
LYMPHOCYTES # BLD AUTO: 0.6 K/UL (ref 1–4.8)
LYMPHOCYTES NFR BLD: 11.3 % (ref 18–48)
MAGNESIUM SERPL-MCNC: 1.9 MG/DL (ref 1.6–2.6)
MCH RBC QN AUTO: 28.4 PG (ref 27–31)
MCHC RBC AUTO-ENTMCNC: 32.5 G/DL (ref 32–36)
MCV RBC AUTO: 87 FL (ref 82–98)
MONOCYTES # BLD AUTO: 0 K/UL (ref 0.3–1)
MONOCYTES NFR BLD: 0.6 % (ref 4–15)
NEUTROPHILS # BLD AUTO: 4.6 K/UL (ref 1.8–7.7)
NEUTROPHILS NFR BLD: 87.5 % (ref 38–73)
NRBC BLD-RTO: 0 /100 WBC
PHOSPHATE SERPL-MCNC: 2.5 MG/DL (ref 2.7–4.5)
PLATELET # BLD AUTO: 114 K/UL (ref 150–450)
PMV BLD AUTO: 10.6 FL (ref 9.2–12.9)
POTASSIUM SERPL-SCNC: 4.4 MMOL/L (ref 3.5–5.1)
PROT SERPL-MCNC: 7.7 G/DL (ref 6–8.4)
RBC # BLD AUTO: 4.69 M/UL (ref 4.6–6.2)
SODIUM SERPL-SCNC: 139 MMOL/L (ref 136–145)
WBC # BLD AUTO: 5.24 K/UL (ref 3.9–12.7)

## 2023-08-28 PROCEDURE — 94761 N-INVAS EAR/PLS OXIMETRY MLT: CPT

## 2023-08-28 PROCEDURE — 83735 ASSAY OF MAGNESIUM: CPT | Performed by: NURSE PRACTITIONER

## 2023-08-28 PROCEDURE — 63600175 PHARM REV CODE 636 W HCPCS: Performed by: INTERNAL MEDICINE

## 2023-08-28 PROCEDURE — 25000003 PHARM REV CODE 250: Performed by: INTERNAL MEDICINE

## 2023-08-28 PROCEDURE — 84100 ASSAY OF PHOSPHORUS: CPT | Performed by: NURSE PRACTITIONER

## 2023-08-28 PROCEDURE — 25000003 PHARM REV CODE 250: Performed by: NURSE PRACTITIONER

## 2023-08-28 PROCEDURE — G0378 HOSPITAL OBSERVATION PER HR: HCPCS

## 2023-08-28 PROCEDURE — 36415 COLL VENOUS BLD VENIPUNCTURE: CPT | Performed by: NURSE PRACTITIONER

## 2023-08-28 PROCEDURE — 94760 N-INVAS EAR/PLS OXIMETRY 1: CPT | Mod: XB

## 2023-08-28 PROCEDURE — 25500020 PHARM REV CODE 255

## 2023-08-28 PROCEDURE — 80053 COMPREHEN METABOLIC PANEL: CPT | Performed by: NURSE PRACTITIONER

## 2023-08-28 PROCEDURE — 85025 COMPLETE CBC W/AUTO DIFF WBC: CPT | Performed by: NURSE PRACTITIONER

## 2023-08-28 PROCEDURE — 92610 EVALUATE SWALLOWING FUNCTION: CPT

## 2023-08-28 PROCEDURE — 92523 SPEECH SOUND LANG COMPREHEN: CPT

## 2023-08-28 PROCEDURE — 97116 GAIT TRAINING THERAPY: CPT | Mod: CQ

## 2023-08-28 RX ORDER — SODIUM,POTASSIUM PHOSPHATES 280-250MG
2 POWDER IN PACKET (EA) ORAL
Status: DISCONTINUED | OUTPATIENT
Start: 2023-08-28 | End: 2023-08-29 | Stop reason: HOSPADM

## 2023-08-28 RX ORDER — VALSARTAN 40 MG/1
160 TABLET ORAL ONCE
Status: COMPLETED | OUTPATIENT
Start: 2023-08-28 | End: 2023-08-28

## 2023-08-28 RX ORDER — LANOLIN ALCOHOL/MO/W.PET/CERES
800 CREAM (GRAM) TOPICAL
Status: DISCONTINUED | OUTPATIENT
Start: 2023-08-28 | End: 2023-08-29 | Stop reason: HOSPADM

## 2023-08-28 RX ORDER — METOPROLOL TARTRATE 25 MG/1
25 TABLET, FILM COATED ORAL 2 TIMES DAILY
Status: DISCONTINUED | OUTPATIENT
Start: 2023-08-28 | End: 2023-08-28 | Stop reason: SDUPTHER

## 2023-08-28 RX ORDER — HYDRALAZINE HYDROCHLORIDE 20 MG/ML
10 INJECTION INTRAMUSCULAR; INTRAVENOUS
Status: DISCONTINUED | OUTPATIENT
Start: 2023-08-28 | End: 2023-08-29 | Stop reason: HOSPADM

## 2023-08-28 RX ORDER — VALSARTAN 80 MG/1
320 TABLET ORAL DAILY
Status: DISCONTINUED | OUTPATIENT
Start: 2023-08-29 | End: 2023-08-29 | Stop reason: HOSPADM

## 2023-08-28 RX ADMIN — PREDNISONE 50 MG: 50 TABLET ORAL at 03:08

## 2023-08-28 RX ADMIN — CLOPIDOGREL BISULFATE 75 MG: 75 TABLET, FILM COATED ORAL at 08:08

## 2023-08-28 RX ADMIN — DIPHENHYDRAMINE HYDROCHLORIDE 50 MG: 50 INJECTION INTRAMUSCULAR; INTRAVENOUS at 01:08

## 2023-08-28 RX ADMIN — ACETAMINOPHEN 650 MG: 325 TABLET, FILM COATED ORAL at 09:08

## 2023-08-28 RX ADMIN — PANTOPRAZOLE SODIUM 40 MG: 40 TABLET, DELAYED RELEASE ORAL at 08:08

## 2023-08-28 RX ADMIN — POTASSIUM & SODIUM PHOSPHATES POWDER PACK 280-160-250 MG 2 PACKET: 280-160-250 PACK at 06:08

## 2023-08-28 RX ADMIN — METOPROLOL TARTRATE 25 MG: 25 TABLET, FILM COATED ORAL at 08:08

## 2023-08-28 RX ADMIN — APIXABAN 5 MG: 2.5 TABLET, FILM COATED ORAL at 08:08

## 2023-08-28 RX ADMIN — POTASSIUM & SODIUM PHOSPHATES POWDER PACK 280-160-250 MG 2 PACKET: 280-160-250 PACK at 08:08

## 2023-08-28 RX ADMIN — IOHEXOL 75 ML: 350 INJECTION, SOLUTION INTRAVENOUS at 03:08

## 2023-08-28 RX ADMIN — VALSARTAN 160 MG: 40 TABLET, FILM COATED ORAL at 08:08

## 2023-08-28 RX ADMIN — VALSARTAN 160 MG: 40 TABLET, FILM COATED ORAL at 12:08

## 2023-08-28 RX ADMIN — ATORVASTATIN CALCIUM 40 MG: 40 TABLET, FILM COATED ORAL at 08:08

## 2023-08-28 RX ADMIN — APIXABAN 5 MG: 2.5 TABLET, FILM COATED ORAL at 10:08

## 2023-08-28 RX ADMIN — ASPIRIN 81 MG: 81 TABLET, COATED ORAL at 08:08

## 2023-08-28 RX ADMIN — DOCUSATE SODIUM AND SENNOSIDES 1 TABLET: 8.6; 5 TABLET, FILM COATED ORAL at 08:08

## 2023-08-28 NOTE — ASSESSMENT & PLAN NOTE
Patient's anemia is currently controlled. Has not received any PRBCs to date.. Etiology likely d/t chronic disease  Current CBC reviewed-   Lab Results   Component Value Date    HGB 13.3 (L) 08/28/2023    HCT 40.9 08/28/2023     Monitor serial CBC and transfuse if patient becomes hemodynamically unstable, symptomatic or H/H drops below 7/21.

## 2023-08-28 NOTE — PLAN OF CARE
Problem: Adult Inpatient Plan of Care  Goal: Plan of Care Review  Outcome: Ongoing, Progressing  Goal: Patient-Specific Goal (Individualized)  Outcome: Ongoing, Progressing  Goal: Absence of Hospital-Acquired Illness or Injury  Outcome: Ongoing, Progressing  Goal: Optimal Comfort and Wellbeing  Outcome: Ongoing, Progressing  Goal: Readiness for Transition of Care  Outcome: Ongoing, Progressing     Problem: Adjustment to Illness (Stroke, Ischemic/Transient Ischemic Attack)  Goal: Optimal Coping  Outcome: Ongoing, Progressing     Problem: Cognitive Impairment (Stroke, Ischemic/Transient Ischemic Attack)  Goal: Optimal Cognitive Function  Outcome: Ongoing, Progressing     Problem: Functional Ability Impaired (Stroke, Ischemic/Transient Ischemic Attack)  Goal: Optimal Functional Ability  Outcome: Ongoing, Progressing     Problem: Fall Injury Risk  Goal: Absence of Fall and Fall-Related Injury  Outcome: Ongoing, Progressing     Problem: Fluid and Electrolyte Imbalance (Acute Kidney Injury/Impairment)  Goal: Fluid and Electrolyte Balance  Outcome: Ongoing, Progressing     Problem: Renal Function Impairment (Acute Kidney Injury/Impairment)  Goal: Effective Renal Function  Outcome: Ongoing, Progressing     Problem: Infection  Goal: Absence of Infection Signs and Symptoms  Outcome: Ongoing, Progressing

## 2023-08-28 NOTE — ASSESSMENT & PLAN NOTE
Patient is chronically on statin.will continue for now. Monitor clinically. Last LDL was   Lab Results   Component Value Date    LDLCALC 56.0 (L) 08/27/2023

## 2023-08-28 NOTE — PROGRESS NOTES
"               [facility]  Department of Neurology  Neurology Progress Note        PATIENT NAME: Min Castro  MRN: [id number]  CSN: [id number]      TODAY'S DATE: 08/28/2023  ADMIT DATE: 8/27/2023                            CONSULTING PROVIDER: Amisha Britton DNP  CONSULT REQUESTED BY: Melva Toscano MD      Reason for consult: TIA       History obtained from chart review and the patient.    HPI per EMR: Min Castro is a 83 y.o. male with a history of CAD with CABG x 3, HLD, pacemaker and carotid stenosis, who presents as a transfer from [facility] to [facility] for neurology services.   Per  (Dr. Maurer):  "Mr. Castro is an 82yo man with a past medical history of CAD, CABG x 3, Dressler's post CABG, asbestosis, HLD, pacemaker, and bilateral carotid artery stenosis.     Per sending MD, "here with medics who were called because the patient developed acute left-sided weakness and slurred speech.  Last known normal 15:50.  Patient reports he was working outside on a fence when he felt a little funny and had family and friends  on his left-sided weakness.  No history of stroke.  Patient does report having 1 beer earlier today while outside working.  No focal pain currently.  No nausea or vomiting."    Neurology consult: Patient was seen and examined by me. He presented with facial droop and speech trouble with aphasia and dysarthria. No associated weakness or sensory changes however there was some reported L side weakness. He said he was working in his yard but he was in shade doing this. Denies any prior history of stroke.      Currently he feels back to baseline and denies any symptoms. CTH was done and negative for acute pathology. CTA was not done due to contrast allergy.     8/28/23: Patient seen and examined while sitting up in chair at bedside. POC reviewed with Dr. Truong. CUS results reviewed with patient and explained that left vertebral artery not " clearly seen and retrograde flow noted to right vertebral artery therefore would like to obtain CTA head and neck after premedicating with Benadryl due to contrast allergy.     Of not, patient has h/o of atrial fibrillation and was advised to take Eliquis daily for anticoagulation. He reports he stopped taking this medication one year ago due to being unable to afford it. He reports cardiologist Dr Shah started him on clopidogrel 75 mg once daily in place of the eliquis. He reports carotid artery stent placed by vascular surgeon Dr Hahn at [facility] about 4 to 5 months ago. He reports after stent placement he was advised to add aspirin 81 mg. So, for the previous 4 to 5 months he has been on DAPT with aspirin 81 mg and plavix 75 mg daily but has not been on any anticoagulation for the last year.     PREVIOUS MEDICAL HISTORY:  Past Medical History:   Diagnosis Date    Coronary artery disease     Hypertension      PREVIOUS SURGICAL HISTORY:  History reviewed. No pertinent surgical history.  FAMILY MEDICAL HISTORY:  Family History   Family history unknown: Yes     SOCIAL HISTORY:  Social History     Tobacco Use    Smoking status: Never    Smokeless tobacco: Never   Substance Use Topics    Alcohol use: Yes     Comment: occasional    Drug use: Never     ALLERGIES:  Review of patient's allergies indicates:   Allergen Reactions    Iodinated contrast media     Contrast media Other (See Comments)     HOME MEDICATIONS:  Prior to Admission medications    Medication Sig Start Date End Date Taking? Authorizing Provider   ALPRAZolam (XANAX XR) 1 MG Tb24 Take by mouth once daily.   Yes Provider, Historical   atorvastatin (LIPITOR) 40 MG tablet Take 40 mg by mouth once daily.   Yes Provider, Historical   b complex vitamins capsule Take 1 capsule by mouth once daily.   Yes Provider, Historical   diphenhydrAMINE-acetaminophen (TYLENOL PM)  mg Tab Take 1 tablet by mouth nightly as needed.   Yes Provider, Historical    metoprolol tartrate (LOPRESSOR) 25 MG tablet Take 25 mg by mouth 2 (two) times daily.   Yes Provider, Historical   pantoprazole (PROTONIX) 40 MG tablet Take 40 mg by mouth once daily.   Yes Provider, Historical   valsartan (DIOVAN) 320 MG tablet Take 320 mg by mouth once daily.   Yes Provider, Historical   levoFLOXacin (LEVAQUIN) 250 MG tablet Take 250 mg by mouth once daily.    Provider, Historical     CURRENT SCHEDULED MEDICATIONS:   apixaban  5 mg Oral BID    aspirin  81 mg Oral Daily    clopidogreL  75 mg Oral Daily    diphenhydrAMINE  50 mg Intravenous Once    metoprolol tartrate  25 mg Oral BID    metoprolol tartrate  25 mg Oral BID    pantoprazole  40 mg Oral Daily    senna-docusate 8.6-50 mg  1 tablet Oral BID    valsartan  160 mg Oral BID    valsartan  320 mg Oral Daily     CURRENT INFUSIONS:   sodium chloride 0.9%       CURRENT PRN MEDICATIONS:  acetaminophen, hydrALAZINE, labetaloL, magnesium oxide, magnesium oxide, ondansetron, potassium bicarbonate, potassium bicarbonate, potassium bicarbonate, potassium, sodium phosphates, potassium, sodium phosphates, potassium, sodium phosphates, sodium chloride 0.9%, sodium chloride 0.9%    REVIEW OF SYSTEMS:  Please refer to the HPI for all pertinent positive and negative findings. A comprehensive review of all other systems was negative.       PHYSICAL EXAM:  Patient Vitals for the past 24 hrs:   BP Temp Temp src Pulse Resp SpO2   08/28/23 0731 (!) 203/84 97.5 °F (36.4 °C) Oral 60 17 96 %   08/28/23 0524 (!) 192/88 97.6 °F (36.4 °C) Oral 70 19 96 %   08/28/23 0245 (!) 182/72 96.1 °F (35.6 °C) Axillary (!) 59 17 95 %   08/27/23 2345 (!) 183/89 97.8 °F (36.6 °C) Oral (!) 56 17 97 %   08/27/23 2111 -- -- -- 60 -- --   08/27/23 2100 (!) 197/81 97.8 °F (36.6 °C) Oral 60 19 96 %   08/27/23 1921 -- -- -- -- -- 97 %   08/27/23 1800 (!) 184/74 97.4 °F (36.3 °C) -- (!) 59 16 96 %   08/27/23 1500 (!) 182/80 97.2 °F (36.2 °C) Oral 60 16 97 %   08/27/23 1200 (!) 153/68 97.8  °F (36.6 °C) Oral (!) 52 16 97 %   08/27/23 1108 -- -- -- 67 18 95 %       GENERAL APPEARANCE: Alert, well-developed, well-nourished male in no acute distress.  HEENT: Normocephalic and atraumatic. PERRL. Oropharynx unremarkable.  PULM: Normal respiratory effort. No accessory muscle use.  CV: RRR.  ABDOMEN: Soft, nontender.  EXTREMITIES: No obvious signs of vascular compromise. Pulses present. No cyanosis, clubbing or edema.  SKIN: Clear; no rashes, lesions or skin breaks in exposed areas.    NEURO:  MENTAL STATUS: Patient awake and oriented to time, place, and person, recent/remote memory normal, attention span/concentration normal, and speech fluent without paraphasic errors.  Affect euthymic.    CRANIAL NERVES:  CN I: Not tested.  CN II: Fundoscopic exam deferred.  CN III, IV, VI: Pupils equal, round and reactive to light.  Extraocular movements full and intact.  CN V: Facial sensation normal.  CN VII: Facial asymmetry absent.  CN VIII: Hearing grossly normal and equal bilaterally.  No skew deviation or pathologic nystagmus.  CN IX, X: Palate elevates symmetrically. Speech/articulation is clear without dysarthria.  CN XI: Shoulder shrug and chin rotation equal with good strength.  CN XII: Tongue protrusion midline.    MOTOR:  Bulk normal. Tone normal and symmetric throughout.  Abnormal movements absent.  Tremor: none present.  Strength 5/5 throughout.    REFLEXES:  DTRs 2+ throughout.  Plantar response downgoing bilaterally.  SENSATION: grossly intact throughout.  COORDINATION: normal finger-to-nose.  STATION: not tested.  GAIT: not tested.      NIHSS:  1a      Level of Consciousness (alert, drowsy, etc.):   0=alert; keenly responsive  1b.     Level of Consciousness Questions (month, age): 0= able to answer both questions  1c.     Level of Consciousness Commands (open, close eyes, make fist, let go):  0=Answers both tasks correctly  2.      Best Gaze (eyes open - patient follows examiner's finger or face):       0=normal  3.      Visual (introduce visual stimulus/threat to patient's visual field quadrants):  0=No visual loss  4.      Facial Palsy (show teeth, raise eyebrows and squeeze eyes shut):        0=Normal symmetric movement  5a.     Motor Arm - Left (elevate extremity 90 degrees and score drift/movement):       0=No drift, limb holds 90 (or 45) degrees for full 10 seconds  5b.     Motor Arm - Right (elevate extremity 90 degrees and score drift/movement):      0=No drift, limb holds 90 (or 45) degrees for full 10 seconds  6a.     Motor Leg - Left (elevate extremity 30 degrees and score drift/movement):       0=No drift, limb holds 90 (or 45) degrees for full 10 seconds  6b      Motor Leg - Right (elevate extremity 30 degrees and score drift/movement):      0=No drift, limb holds 90 (or 45) degrees for full 10 seconds  7.      Limb Ataxia (finger-nose, heel down shin):      0=Absent  8.      Sensory (pin prick to face, arm, trunk, and leg - compare side to side):        0=Normal; no sensory loss  9.      Best Language (name items, describe a picture and read sentences):      0=No aphasia, normal  10.     Dysarthria (evaluate speech clarity by patient repeating listed words): 0=Normal  11.     Extinction and Inattention (use prior testing to identify neglect or double simultaneous stimuli testing):      0=No abnormality          NIH Stroke Scale Total:         0      Labs:  Recent Labs   Lab 08/26/23 1650 08/28/23  0455    139   K 4.0 4.4    108   CO2 20* 20*   BUN 31* 24*   CREATININE 2.0* 1.5*   GLU 99 144*   CALCIUM 8.6* 8.8   PHOS  --  2.5*   MG 2.0 1.9     Recent Labs   Lab 08/26/23 1650 08/28/23  0455   WBC 5.39 5.24   HGB 12.5* 13.3*   HCT 39.2* 40.9   * 114*     Recent Labs   Lab 08/26/23 1650 08/28/23  0455   ALBUMIN 3.6 3.5   PROT 7.3 7.7   BILITOT 0.7 0.7   ALKPHOS 97 90   ALT 13 12   AST 15 13     Lab Results   Component Value Date    INR 1.1 08/27/2023     Lab Results   Component  "Value Date    TRIG 65 08/27/2023    HDL 41 08/27/2023    CHOLHDL 37.3 08/27/2023     Lab Results   Component Value Date    HGBA1C 5.9 08/27/2023     No results found for: "PROTEINCSF", "GLUCCSF", "WBCCSF"    Imaging:  I have reviewed and interpreted the pertinent imaging and lab results.      US Carotid Bilateral  Narrative: EXAMINATION:  US CAROTID BILATERAL    CLINICAL HISTORY:  Velocities evaluation;    TECHNIQUE:  Grayscale and color Doppler ultrasound examination of the carotid and vertebral artery systems bilaterally.  Stenosis estimates are per the NASCET measurement criteria.    COMPARISON:  None.    FINDINGS:  Right:    The patient has a right ICA stent which is patent.  Peak velocity is 57.    The flow in the right vertebral artery is retrograde.    Left:    Internal Carotid Artery (ICA)  peak systolic velocity 117 cm/sec    ICA/CCA peak systolic velocity ratio: 1.4    Plaque formation: Moderate    Vertebral artery: Not visualized.  Impression: The patient has a patent right internal carotid arterial stent.    There is less than 50% stenosis of the left internal carotid artery.    Flow within the right vertebral artery is retrograde.  This may signify subclavian stenosis.    The low within the left vertebral artery was not seen in this may be occluded.    Electronically signed by: Kika Wood MD  Date:    08/27/2023  Time:    12:49         ASSESSMENT & PLAN:      TIA  Hypertension  Atrial fibrillation      Plan  Admitted for further stroke workup. CTH negative  MRI cannot be done due to pacemaker  CUS: MAGAN stent noted; LICA stenosis of <50%; right vertebral artery retrograde flow possibly indicated subclavian stenosis; left vertebral artery not clearly visualized indicating possible occlusion   Advise to premedicate and obtain CTA head and neck.  Continue DAPT with Aspirin 81 mg and clopidogrel 75 mg daily for a least 6 months s/p carotid artery stent placement unless otherwise directed by vascular " surgeon - Dr Hahn   Patient was not taking Eliquis at home due high cost. Patient will need to be anticoagulated for stroke prevention in the setting of Afib. Can switch to Xarelto for cheaper out of pocket cost.   Continue Lipitor  Permissive BP to 220 systolic for 24 hrs from symptom onset and after that normalize BP  PT OT  Speech therapy  DVT prophylaxis with chemo/SCD prophylaxis  Discussed lifestyle modifications as prophylactic measures for stroke prevention including adequate blood pressure management, healthy diet and regular exercise.         Thank you kindly for including us in the care of this patient. Please do not hesitate to contact us with any questions.             Amisha Britton DNP  Neurology/vascular Neurology  Date of Service: 08/28/2023  9:49 AM

## 2023-08-28 NOTE — CONSULTS
"                                                    Food & Nutrition                                                           Education     Diet Education: Cardiac Diet  Time Spent: 15 minutes  Learners: Patient and wife        Nutrition Education provided with handouts: yes        Comments: Patient admitted with possible stroke. PMH stenosis, CAD, CABG, GERD, HLD, pacemaker. PTA pt follows a general healthful diet, he has cut back on salt, red meat and fried foods and eats plenty of fruits and vegetables. In the past though, he used to eat an overabundance of these foods and "salt everything". I reviewed cardiac diet education with pt and wife. Teachback method utilized successfully.      Assessment: NFPE 8/28/23 mild wasting @ clavicle only. Pt ate 100% of his eggs and 2 slices of toast today. Pt typically eats multiple small meals daily.  All questions and concerns answered. Dietitian's contact information provided.         Follow-Up: yes     Please Re-consult as needed           Thanks!          "

## 2023-08-28 NOTE — PLAN OF CARE
Problem: Physical Therapy  Goal: Physical Therapy Goal  Description: Goals to be met by: 9/15/2023     Patient will increase functional independence with mobility by performin). Supine to sit with Modified [employer]  2). Sit to supine with Modified [employer] [place]  3). Sit to stand transfer with Modified [employer] [place]  4). Gait  x > 100 feet with Modified [employer] [place]     Outcome: Ongoing, Progressing   Ambulate with assistance for safety.

## 2023-08-28 NOTE — SUBJECTIVE & OBJECTIVE
Interval History:  Patient's blood pressure remaining high.  Patient denies any new focal neuro deficits.  Denies any headache vision changes speech dysfunction or any facial asymmetry.  Patient's wife and daughter are present at bedside.    Review of Systems   Constitutional:  Negative for chills and fever.   HENT:  Negative for congestion and sore throat.    Eyes:  Negative for visual disturbance.   Respiratory:  Negative for cough and shortness of breath.    Cardiovascular:  Negative for chest pain and palpitations.   Gastrointestinal:  Negative for abdominal pain, constipation, diarrhea, nausea and vomiting.   Endocrine: Negative for cold intolerance and heat intolerance.   Genitourinary:  Negative for dysuria and hematuria.   Musculoskeletal:  Negative for arthralgias and myalgias.   Skin:  Negative for rash.   Neurological:  Positive for facial asymmetry and speech difficulty. Negative for tremors and seizures.   Hematological:  Negative for adenopathy. Does not bruise/bleed easily.   All other systems reviewed and are negative.    Objective:     Vital Signs (Most Recent):  Temp: 97.5 °F (36.4 °C) (08/28/23 0731)  Pulse: 60 (08/28/23 0731)  Resp: 17 (08/28/23 0731)  BP: (!) 203/84 (08/28/23 0731)  SpO2: 96 % (08/28/23 0731) Vital Signs (24h Range):  Temp:  [96.1 °F (35.6 °C)-97.8 °F (36.6 °C)] 97.5 °F (36.4 °C)  Pulse:  [52-70] 60  Resp:  [16-19] 17  SpO2:  [95 %-97 %] 96 %  BP: (153-203)/(68-89) 203/84     Weight: 76.6 kg (168 lb 14 oz)  Body mass index is 26.45 kg/m².    Intake/Output Summary (Last 24 hours) at 8/28/2023 0972  Last data filed at 8/28/2023 0415  Gross per 24 hour   Intake 1220 ml   Output 1050 ml   Net 170 ml         Physical Exam  Vitals and nursing note reviewed.   Constitutional:       General: He is awake. He is not in acute distress.     Appearance: Normal appearance. He is well-developed. He is not ill-appearing.   HENT:      Head: Normocephalic and atraumatic.      Nose: Nose normal.  No septal deviation.   Eyes:      Conjunctiva/sclera: Conjunctivae normal.      Pupils: Pupils are equal, round, and reactive to light.   Neck:      Thyroid: No thyroid mass.      Vascular: No JVD.      Trachea: No tracheal tenderness or tracheal deviation.   Cardiovascular:      Rate and Rhythm: Normal rate and regular rhythm.      Heart sounds: Normal heart sounds, S1 normal and S2 normal. No murmur heard.     No friction rub. No gallop.   Pulmonary:      Effort: Pulmonary effort is normal.      Breath sounds: Normal breath sounds. No decreased breath sounds, wheezing, rhonchi or rales.   Abdominal:      General: Bowel sounds are normal. There is no distension.      Palpations: Abdomen is soft. There is no hepatomegaly, splenomegaly or mass.      Tenderness: There is no abdominal tenderness.   Skin:     General: Skin is warm.      Findings: No rash.   Neurological:      General: No focal deficit present.      Mental Status: He is alert and oriented to person, place, and time. Mental status is at baseline.      GCS: GCS eye subscore is 4. GCS verbal subscore is 5. GCS motor subscore is 6.      Cranial Nerves: Cranial nerves 2-12 are intact. No cranial nerve deficit.      Sensory: Sensation is intact. No sensory deficit.      Motor: Motor function is intact.   Psychiatric:         Mood and Affect: Mood normal.         Behavior: Behavior normal. Behavior is cooperative.             Significant Labs: All pertinent labs within the past 24 hours have been reviewed.  CBC:   Recent Labs   Lab 08/26/23  1650 08/28/23  0455   WBC 5.39 5.24   HGB 12.5* 13.3*   HCT 39.2* 40.9   * 114*     CMP:   Recent Labs   Lab 08/26/23  1650 08/28/23  0455    139   K 4.0 4.4    108   CO2 20* 20*   GLU 99 144*   BUN 31* 24*   CREATININE 2.0* 1.5*   CALCIUM 8.6* 8.8   PROT 7.3 7.7   ALBUMIN 3.6 3.5   BILITOT 0.7 0.7   ALKPHOS 97 90   AST 15 13   ALT 13 12   ANIONGAP 11 11     Lipid Panel:   Recent Labs   Lab 08/27/23  0521  "  CHOL 110*   HDL 41   LDLCALC 56.0*   TRIG 65   CHOLHDL 37.3     Troponin: No results for input(s): "TROPONINI", "TROPONINIHS" in the last 48 hours.  TSH:   Recent Labs   Lab 08/27/23  0516   TSH 2.338     Urine Studies:   Recent Labs   Lab 08/26/23  1753   COLORU Yellow   APPEARANCEUA Clear   PHUR 6.0   SPECGRAV 1.020   PROTEINUA 1+*   GLUCUA Negative   KETONESU Trace*   BILIRUBINUA Negative   OCCULTUA Negative   NITRITE Negative   UROBILINOGEN 1.0   LEUKOCYTESUR Negative   RBCUA 0   WBCUA 0   BACTERIA Rare   SQUAMEPITHEL occasional   HYALINECASTS occasional*     Microbiology Results (last 7 days)       ** No results found for the last 168 hours. **        Significant Imaging:   ECHO with bubble study: Patient refused as he has had ECHO 2 weeks ago by his cardiologist.     US Carotid arteries:  The patient has a patent right internal carotid arterial stent.  There is less than 50% stenosis of the left internal carotid artery.  Flow within the right vertebral artery is retrograde.  This may signify subclavian stenosis.  The low within the left vertebral artery was not seen in this may be occluded.     CT Head:  No acute abnormality.  "

## 2023-08-28 NOTE — PLAN OF CARE
Problem: Adult Inpatient Plan of Care  Goal: Plan of Care Review  Outcome: Ongoing, Progressing  Goal: Patient-Specific Goal (Individualized)  Outcome: Ongoing, Progressing  Goal: Absence of Hospital-Acquired Illness or Injury  Outcome: Ongoing, Progressing  Goal: Optimal Comfort and Wellbeing  Outcome: Ongoing, Progressing  Goal: Readiness for Transition of Care  Outcome: Ongoing, Progressing     Problem: Adjustment to Illness (Stroke, Ischemic/Transient Ischemic Attack)  Goal: Optimal Coping  Outcome: Ongoing, Progressing     Problem: Bowel Elimination Impaired (Stroke, Ischemic/Transient Ischemic Attack)  Goal: Effective Bowel Elimination  Outcome: Ongoing, Progressing     Problem: Cerebral Tissue Perfusion (Stroke, Ischemic/Transient Ischemic Attack)  Goal: Optimal Cerebral Tissue Perfusion  Outcome: Ongoing, Progressing     Problem: Cognitive Impairment (Stroke, Ischemic/Transient Ischemic Attack)  Goal: Optimal Cognitive Function  Outcome: Ongoing, Progressing     Problem: Communication Impairment (Stroke, Ischemic/Transient Ischemic Attack)  Goal: Improved Communication Skills  Outcome: Ongoing, Progressing     Problem: Functional Ability Impaired (Stroke, Ischemic/Transient Ischemic Attack)  Goal: Optimal Functional Ability  Outcome: Ongoing, Progressing     Problem: Respiratory Compromise (Stroke, Ischemic/Transient Ischemic Attack)  Goal: Effective Oxygenation and Ventilation  Outcome: Ongoing, Progressing     Problem: Sensorimotor Impairment (Stroke, Ischemic/Transient Ischemic Attack)  Goal: Improved Sensorimotor Function  Outcome: Ongoing, Progressing     Problem: Swallowing Impairment (Stroke, Ischemic/Transient Ischemic Attack)  Goal: Optimal Eating and Swallowing without Aspiration  Outcome: Ongoing, Progressing     Problem: Urinary Elimination Impaired (Stroke, Ischemic/Transient Ischemic Attack)  Goal: Effective Urinary Elimination  Outcome: Ongoing, Progressing     Problem: Fall Injury  Risk  Goal: Absence of Fall and Fall-Related Injury  Outcome: Ongoing, Progressing     Problem: Fluid and Electrolyte Imbalance (Acute Kidney Injury/Impairment)  Goal: Fluid and Electrolyte Balance  Outcome: Ongoing, Progressing     Problem: Oral Intake Inadequate (Acute Kidney Injury/Impairment)  Goal: Optimal Nutrition Intake  Outcome: Ongoing, Progressing     Problem: Renal Function Impairment (Acute Kidney Injury/Impairment)  Goal: Effective Renal Function  Outcome: Ongoing, Progressing     Problem: Infection  Goal: Absence of Infection Signs and Symptoms  Outcome: Ongoing, Progressing

## 2023-08-28 NOTE — ASSESSMENT & PLAN NOTE
Uncontrolled.  Resume Metoprolol and Diovan. Telemonitoring.     Will utilize p.r.n. blood pressure medication only if patient's blood pressure greater than 220/110 and he develops symptoms such as worsening chest pain or shortness of breath.

## 2023-08-28 NOTE — ASSESSMENT & PLAN NOTE
Patient with acute kidney injury/acute renal failure. VANESSA is currently Improving. Baseline creatinine unknown - Labs reviewed- Renal function/electrolytes with Estimated Creatinine Clearance: 34.3 mL/min (A) (based on SCr of 1.5 mg/dL (H)). according to latest data. Monitor urine output and serial BMP and adjust therapy as needed. Avoid nephrotoxins and renally dose meds for GFR listed above.

## 2023-08-28 NOTE — PROGRESS NOTES
Echo was DC by Dr Toscano. Pt stated that he had an echo done 2 weeks ago in Dr Shah's office. He is working on getting those results sent here.

## 2023-08-28 NOTE — PT/OT/SLP EVAL
Speech Language Pathology Evaluation  Cognitive/Bedside Swallow    Patient Name:  Min Castro   MRN:  [id number]  Admitting Diagnosis: TIA (transient ischemic attack)    Recommendations:                  General Recommendations:  Follow-up not indicated  Diet recommendations:  Regular, Thin   Aspiration Precautions: Standard aspiration precautions   General Precautions: Standard,    Communication strategies:  none    Assessment:   Clinical swallow and cognitive communication evaluations completed. Oropharyngeal swallow judged to be WFL. REC Regular textures (IDDSI 7) with thin liquids. Speech, language and cognition appear to be at baseline and WFL. Skilled ST not indicated at this time. Please re consult PRN.         History:   PER Interval History:   Patient is an 83-year-old pleasant  male with past medical history significant for coronary artery disease status post coronary artery bypass graft surgery, status post permanent pacemaker placement, history of atrial fibrillation (on long-term anticoagulation therapy), carotid artery disease status post right carotid endarterectomy, hyperlipidemia who has been admitted to Hospital Medicine from [place], [place] for evaluation of sudden onset left-sided weakness with speech dysfunction which lasted less than 1 hour and resolved spontaneously.  Patient denied any prior history of TIA or stroke-like symptoms.  No headache, vision changes or seizure activity reported.  Patient is feeling back to baseline.  Patient could not undergo MRI due to pacemaker placement.  Last week patient had carotid Doppler ultrasound performed by his vascular surgeon and prior to that patient had pacemaker checked by his cardiologist and results were reported within normal limits.       Past Medical History:   Diagnosis Date    Coronary artery disease     Hypertension        History reviewed. No pertinent surgical history.    Social History: Patient lives with  wife.    Prior Intubation HX:  NA    Modified Barium Swallow: NA    Imaging:  US Carotid Bilateral   Final Result      The patient has a patent right internal carotid arterial stent.      There is less than 50% stenosis of the left internal carotid artery.      Flow within the right vertebral artery is retrograde.  This may signify subclavian stenosis.      The low within the left vertebral artery was not seen in this may be occluded.         Electronically signed by: Kika Wood MD   Date:    08/27/2023   Time:    12:49      CTA Head and Neck (xpd)    (Results Pending)        Prior diet: Regular/thin.    Occupation/hobbies/homemaking: Pt/family report PLOF as independent with ADLS, independent with IADLS. Pt does drive. Level of education is college degree.    Subjective     No c/o     Pain/Comfort:  Pain Rating Post-Intervention 1: 0/10    Respiratory Status: Room air    Objective:   Pt seen for clinical swallow and cognitive communication evaluation. He is AAOx4, pleasant and cooperative. Very talkative but appropriate. Able to provide reliable and detailed history. Wife and daughter at bedside and report speech, language and mental status has returned to baseline.     Cognitive Status:  MoCA (Version 8.1) administered with pt achieving a score of 20/30 suggesting possible mild cognitive-linguistic impairment. Pt earned 4 of 5 points on visuospatial/executive functions, 2 of 3 points on naming, 2 of 6 points for attention, 2 of 3 points for language, 0 of 2 points for abstraction, 4 of 5 points for delayed recall and 6 of 6 points for orientation.  (Performance possibly adversely affected by hearing impairment and frequent interruptions).     Attention --Frequently interrupts speaker when talking which adversely affects ability to follow complex instructions. Very talkative, though appropriate. Not judged to be tangential or hyperverbal.   Orientation Oriented x4  Memory Immediate Recall --WFL, Delayed--WFL,  "and recall general information --WFL  Problem [employer]ving [employer] --WFL  Simple calculation --Mild difficulty. Baseline per pt and daughter       Receptive Language:   Comprehension:      WFL  Questions Simple yes/no --WNL  Complex yes/no --WNL  Commands  One step --WNL  two step basic commands --WNL  multistep basic commands --WFL  complex/abstract commands --mild difficultly 2° frequenting talking over speaker; interrupting. Baseline behavior per daughter "He's 83 and we aren't going to change that now!"  Possibly related to hearing impairment vs social language impairment vs baseline personality.     Expressive Language:  Verbal:    WNL      Motor Speech: WNL    Voice: WNL    Visual-Spatial: WNL      Oral Musculature Evaluation  Oral Musculature: WNL  Dentition: present and adequate  Secretion Management: adequate  Mucosal Quality: good  Mandibular Strength and Mobility: WNL  Oral Labial Strength and Mobility: WNL  Lingual Strength and Mobility: WNL  Velar Elevation: WFL  Buccal Strength and Mobility: WFL  Volitional Cough: adequate  Volitional Swallow: Able to palpate laryngeal rise  Voice Prior to PO Intake: clear    Bedside Swallow Eval:   Consistencies Assessed:  Thin liquids --via straw  Puree --applesauce  Mixed consistencies --diced peaches  Solids --jennifer cracker      Oral Phase:   WFL    Pharyngeal Phase:   no overt clinical signs/symptoms of aspiration    Compensatory Strategies  None    Treatment: Pt/family educated re: results/recs of evaluation. Pt and family agree speech, language and cognition is near baseline and WFL and deny need for acute ST.        Plan:     Patient to be seen:      Plan of Care expires:     Plan of Care reviewed with:  patient, spouse, daughter   SLP Follow-Up:  No       Discharge recommendations:  Discharge Facility/Level of Care Needs: home   Barriers to Discharge:  None    Time Tracking:     SLP Treatment Date:   08/28/23  Speech Start Time:  1027  Speech Stop Time:  1119   "   Speech Total Time (min):  52 min    Billable Minutes: Eval 30 , Eval Swallow and Oral Function 12, and Total Time 52    08/28/2023

## 2023-08-28 NOTE — PT/OT/SLP PROGRESS
Physical Therapy Treatment    Patient Name:  Min Castro   MRN:  [id number]    Recommendations:     Discharge Recommendations: home health PT  Discharge Equipment Recommendations: none (has cane and walker)  Barriers to discharge: None    Assessment:     Min Castro is a 83 y.o. male admitted with a medical diagnosis of TIA (transient ischemic attack).  He presents with the following impairments/functional limitations: weakness, impaired endurance, impaired self care skills, impaired functional mobility, gait instability, decreased lower extremity function, decreased safety awareness . Supine in bed with spouse present.  Agreed to mobilize. Very talkative. Declined use of rw initially but agreed to use upon transfer EOB feeling slightly weak. Nurse assessed BP prior.  Ambulated 250' with rw and CGA. Decreased toe off noted B feet ( reports having neuropathy). Returned to room to sit up in chair with family and ST present.     Rehab Prognosis: Good; patient would benefit from acute skilled PT services to address these deficits and reach maximum level of function.    Recent Surgery: * No surgery found *      Plan:     During this hospitalization, patient to be seen 6 x/week to address the identified rehab impairments via gait training, therapeutic activities, therapeutic exercises and progress toward the following goals:    Plan of Care Expires:  09/15/23    Subjective     Chief Complaint: neuropathy and restless leg syndrome per patient.  Patient/Family Comments/goals: to return home with family  Pain/Comfort:  Pain Rating 1: 0/10      Objective:     Communicated with nurse Aparicio prior to session.  Patient found supine with bed alarm, telemetry upon PT entry to room.     General Precautions: Standard, fall, pacemaker, hearing impaired  Orthopedic Precautions: N/A  Braces: N/A  Respiratory Status: Room air     Functional Mobility:  Bed Mobility:     Rolling Right: stand by assistance  Supine to Sit: stand by  assistance  Transfers:     Sit to Stand:  contact guard assistance with rolling walker  Gait: 250' with rw and CGA.      AM-PAC 6 CLICK MOBILITY          Treatment & Education:  Ambulated with rw and CGA for safety.     Patient left up in chair with all lines intact, call button in reach, chair alarm on, nurse Alessandra notified, and family and ST present..    GOALS:   Multidisciplinary Problems       Physical Therapy Goals          Problem: Physical Therapy    Goal Priority Disciplines Outcome Goal Variances Interventions   Physical Therapy Goal     PT, PT/OT Ongoing, Progressing     Description: Goals to be met by: 9/15/2023     Patient will increase functional independence with mobility by performin). Supine to sit with Modified [place]  2). Sit to supine with Modified [place]  3). Sit to stand transfer with Modified [place]  4). Gait  x > 100 feet with Modified [place]                          Time Tracking:     PT Received On: 23  PT Start Time: 1019     PT Stop Time: 1027  PT Total Time (min): 8 min     Billable Minutes: Gait Training 8min    Treatment Type: Treatment  PT/PTA: PTA     Number of PTA visits since last PT visit: 2023

## 2023-08-28 NOTE — ASSESSMENT & PLAN NOTE
Admit to obs  Antithrombotics for secondary stroke prevention: Start Eliquis 5 mg BID. Continue ASA 81 mg daily. Bleeding risks discussed. Fall precautions discussed.     Statins for secondary stroke prevention and hyperlipidemia, if present:   Statins: Atorvastatin- 40 mg daily    Aggressive risk factor modification: HTN, HLD, Obesity, A-Fib, CAD     Rehab efforts: The patient has been evaluated by a stroke team provider and the therapy needs have been fully considered based off the presenting complaints and exam findings. The following therapy evaluations are needed: PT evaluate and treat, OT evaluate and treat, SLP evaluate and treat    Diagnostics ordered/pending: Carotid ultrasound to assess vasculature, CT scan of head without contrast to asses brain parenchyma, HgbA1C to assess blood glucose levels, Lipid Profile to assess cholesterol levels, TTE to assess cardiac function/status , TSH to assess thyroid function    VTE prophylaxis: Mechanical prophylaxis: Place SCDs    BP parameters: TIA: SBP <220 until imaging confirmation of no infarct

## 2023-08-28 NOTE — PROGRESS NOTES
"[facility]  Progress Note    Patient Name: Min Castro  MRN: [id number]  Patient Class: OP- Observation   Admission Date: 8/27/2023  Length of Stay: 0 days  Attending Physician: Melva Toscano MD  Primary Care Provider: Ray Galdamez MD        Subjective:     Principal Problem:TIA (transient ischemic attack)        HPI:  Min Castro is an 83 year old male with a past medical history of CAD with CABG x 3, HLD, pacemaker and carotid stenosis, who presents as a transfer from [facility] to [facility] for neurology services.   Per  (Dr. Maurer):  "Mr. Castro is an 82yo man with a past medical history of CAD, CABG x 3, Dressler's post CABG, asbestosis, HLD, pacemaker, and bilateral carotid artery stenosis.    Per sending MD, "here with medics who were called because the patient developed acute left-sided weakness and slurred speech.  Last known normal 15:50.  Patient reports he was working outside on a fence when he felt a little funny and had family and friends  on his left-sided weakness.  No history of stroke.  Patient does report having 1 beer earlier today while outside working.  No focal pain currently.  No nausea or vomiting."    In the ED his VS are BP (!) 127/49   Pulse 65   Temp 97.8 °F (36.6 °C)   Resp 16   Ht 5' 7" (1.702 m)   Wt 75.8 kg (167 lb)   SpO2 96%   BMI 26.16 kg/m².    VS since arrival are:  Temp:  [97.8 °F (36.6 °C)] 97.8 °F (36.6 °C)  Pulse:  [63-65] 65  Resp:  [16-20] 16  SpO2:  [96 %-98 %] 96 %  BP: (127-148)/(49-76) 127/49     Labs showed:  HG 12.5, , INR 1.2  CR 2, K 4, CA 8.6, ALB 3.6, NML LFT  ETOH < 10, UA NEG  No old labs for comparison    NC CT HEAD:  Ventricles and sulci are normal in size for age without evidence of hydrocephalus.   No extra-axial blood or fluid collections.  The brain parenchyma appears normal.   No parenchymal mass, hemorrhage, edema or major vascular distribution infarct.  " "  Skull/extracranial contents (limited evaluation): No fracture.   Mastoid air cells and paranasal sinuses are essentially clear.     Impression:   No acute abnormality.    In the ED he was treated with:  Medications  sodium chloride 0.9% bolus 500 mL 500 mL (500 mLs Intravenous New Bag 8/26/23 1724)  0.9%  NaCl infusion (has no administration in time range)  0.9%  NaCl infusion (0 mLs Intravenous Stopped 8/26/23 1801)  aspirin tablet 325 mg (325 mg Oral Given 8/26/23 1658)    Per tele-Neuro stroke consult by Dr. Acuna:  TIA (transient ischemic attack)  Majority of symptoms are now resolved (Left face, dysarthria)  Some subtle e/o sensory extension on left which is not 100% present on multiple exams.  Plan on stat CTA @ spoke to r/o high risk vascular lesion requiring intervention or further monitoring and transfer.  Load aspirin 325 mg & clopidogrel 300 mg x 1 now, followed by daily aspirin 81 mg /  clopidogrel 75 mg x 30 days followed by monotherapy therafter  Recommend:  - MRI brain w/o contrast to evaluate for presence and characterization of infarct  - TTE   - lipid profile and A1c for evaluation of modifiable risk factors  - PT/OT/SLP eval and Rx  - Permissive HTN < 220/120 mmHg x48-72h pending results of vessel imaging    ADDENDUM:  On further review, patient noted to have pacemaker in place, so he cannot have MRI at [facility].  His EKG shows atrial fibrillation, a diagnosis not mentioned in Care Everywhere.  I reached back out to Neuro on call, Dr. Silva.  He felt not being able to get MRI would not  with an NIH stroke scale of 1, so he was okay with the patient going to a [facility] hospital and not performing MRI.  Regarding his Afib, Dr. Silva recommended bridging with ASA 325mg and Plavix 300mg as noted above, until formal Neuro and Cards consults could be obtained tomorrow at [place]."    Patient arrived to [facility] without incident. He states that he feels much better now and " denies any complaint. He states that at home, he didn't realize anything was happening to him at first, but his family noticed his face become distorted with a left sided facial droop. He states he felt like he couldn't get his thoughts together and was having difficulty expressing himself verbally, then noticed his speech was slurred. All of those symptoms have since resolved. He states he recently had bilateral carotid ultrasounds and an echo performed, but it is not able to be accessed in [facility]. Hospital medicine consulted for admission and further management.      Overview/Hospital Course:  No notes on file    Interval History:  Patient's blood pressure remaining high.  Patient denies any new focal neuro deficits.  Denies any headache vision changes speech dysfunction or any facial asymmetry.  Patient's wife and daughter are present at bedside.    Review of Systems   Constitutional:  Negative for chills and fever.   HENT:  Negative for congestion and sore throat.    Eyes:  Negative for visual disturbance.   Respiratory:  Negative for cough and shortness of breath.    Cardiovascular:  Negative for chest pain and palpitations.   Gastrointestinal:  Negative for abdominal pain, constipation, diarrhea, nausea and vomiting.   Endocrine: Negative for cold intolerance and heat intolerance.   Genitourinary:  Negative for dysuria and hematuria.   Musculoskeletal:  Negative for arthralgias and myalgias.   Skin:  Negative for rash.   Neurological:  Positive for facial asymmetry and speech difficulty. Negative for tremors and seizures.   Hematological:  Negative for adenopathy. Does not bruise/bleed easily.   All other systems reviewed and are negative.    Objective:     Vital Signs (Most Recent):  Temp: 97.5 °F (36.4 °C) (08/28/23 0731)  Pulse: 60 (08/28/23 0731)  Resp: 17 (08/28/23 0731)  BP: (!) 203/84 (08/28/23 0731)  SpO2: 96 % (08/28/23 0731) Vital Signs (24h Range):  Temp:  [96.1 °F (35.6 °C)-97.8 °F (36.6 °C)] 97.5 °F  (36.4 °C)  Pulse:  [52-70] 60  Resp:  [16-19] 17  SpO2:  [95 %-97 %] 96 %  BP: (153-203)/(68-89) 203/84     Weight: 76.6 kg (168 lb 14 oz)  Body mass index is 26.45 kg/m².    Intake/Output Summary (Last 24 hours) at 8/28/2023 0987  Last data filed at 8/28/2023 0415  Gross per 24 hour   Intake 1220 ml   Output 1050 ml   Net 170 ml         Physical Exam  Vitals and nursing note reviewed.   Constitutional:       General: He is awake. He is not in acute distress.     Appearance: Normal appearance. He is well-developed. He is not ill-appearing.   HENT:      Head: Normocephalic and atraumatic.      Nose: Nose normal. No septal deviation.   Eyes:      Conjunctiva/sclera: Conjunctivae normal.      Pupils: Pupils are equal, round, and reactive to light.   Neck:      Thyroid: No thyroid mass.      Vascular: No JVD.      Trachea: No tracheal tenderness or tracheal deviation.   Cardiovascular:      Rate and Rhythm: Normal rate and regular rhythm.      Heart sounds: Normal heart sounds, S1 normal and S2 normal. No murmur heard.     No friction rub. No gallop.   Pulmonary:      Effort: Pulmonary effort is normal.      Breath sounds: Normal breath sounds. No decreased breath sounds, wheezing, rhonchi or rales.   Abdominal:      General: Bowel sounds are normal. There is no distension.      Palpations: Abdomen is soft. There is no hepatomegaly, splenomegaly or mass.      Tenderness: There is no abdominal tenderness.   Skin:     General: Skin is warm.      Findings: No rash.   Neurological:      General: No focal deficit present.      Mental Status: He is alert and oriented to person, place, and time. Mental status is at baseline.      GCS: GCS eye subscore is 4. GCS verbal subscore is 5. GCS motor subscore is 6.      Cranial Nerves: Cranial nerves 2-12 are intact. No cranial nerve deficit.      Sensory: Sensation is intact. No sensory deficit.      Motor: Motor function is intact.   Psychiatric:         Mood and Affect: Mood  "normal.         Behavior: Behavior normal. Behavior is cooperative.             Significant Labs: All pertinent labs within the past 24 hours have been reviewed.  CBC:   Recent Labs   Lab 08/26/23  1650 08/28/23  0455   WBC 5.39 5.24   HGB 12.5* 13.3*   HCT 39.2* 40.9   * 114*     CMP:   Recent Labs   Lab 08/26/23  1650 08/28/23  0455    139   K 4.0 4.4    108   CO2 20* 20*   GLU 99 144*   BUN 31* 24*   CREATININE 2.0* 1.5*   CALCIUM 8.6* 8.8   PROT 7.3 7.7   ALBUMIN 3.6 3.5   BILITOT 0.7 0.7   ALKPHOS 97 90   AST 15 13   ALT 13 12   ANIONGAP 11 11     Lipid Panel:   Recent Labs   Lab 08/27/23  0516   CHOL 110*   HDL 41   LDLCALC 56.0*   TRIG 65   CHOLHDL 37.3     Troponin: No results for input(s): "TROPONINI", "TROPONINIHS" in the last 48 hours.  TSH:   Recent Labs   Lab 08/27/23  0516   TSH 2.338     Urine Studies:   Recent Labs   Lab 08/26/23  1753   COLORU Yellow   APPEARANCEUA Clear   PHUR 6.0   SPECGRAV 1.020   PROTEINUA 1+*   GLUCUA Negative   KETONESU Trace*   BILIRUBINUA Negative   OCCULTUA Negative   NITRITE Negative   UROBILINOGEN 1.0   LEUKOCYTESUR Negative   RBCUA 0   WBCUA 0   BACTERIA Rare   SQUAMEPITHEL occasional   HYALINECASTS occasional*     Microbiology Results (last 7 days)       ** No results found for the last 168 hours. **        Significant Imaging:   ECHO with bubble study: Patient refused as he has had ECHO 2 weeks ago by his cardiologist.     US Carotid arteries:  The patient has a patent right internal carotid arterial stent.  There is less than 50% stenosis of the left internal carotid artery.  Flow within the right vertebral artery is retrograde.  This may signify subclavian stenosis.  The low within the left vertebral artery was not seen in this may be occluded.     CT Head:  No acute abnormality.      Assessment/Plan:      * TIA (transient ischemic attack)  Admit to obs  Antithrombotics for secondary stroke prevention: Start Eliquis 5 mg BID. Continue ASA 81 mg " daily. Bleeding risks discussed. Fall precautions discussed.     Statins for secondary stroke prevention and hyperlipidemia, if present:   Statins: Atorvastatin- 40 mg daily    Aggressive risk factor modification: HTN, HLD, Obesity, A-Fib, CAD     Rehab efforts: The patient has been evaluated by a stroke team provider and the therapy needs have been fully considered based off the presenting complaints and exam findings. The following therapy evaluations are needed: PT evaluate and treat, OT evaluate and treat, SLP evaluate and treat    Diagnostics ordered/pending: Carotid ultrasound to assess vasculature, CT scan of head without contrast to asses brain parenchyma, HgbA1C to assess blood glucose levels, Lipid Profile to assess cholesterol levels, TTE to assess cardiac function/status , TSH to assess thyroid function    VTE prophylaxis: Mechanical prophylaxis: Place SCDs    BP parameters: TIA: SBP <220 until imaging confirmation of no infarct         Thrombocytopenia  Platelets on admit 135  Anticoagulation held per neuro orders  Trend CBC      Anemia  Patient's anemia is currently controlled. Has not received any PRBCs to date.. Etiology likely d/t chronic disease  Current CBC reviewed-   Lab Results   Component Value Date    HGB 13.3 (L) 08/28/2023    HCT 40.9 08/28/2023     Monitor serial CBC and transfuse if patient becomes hemodynamically unstable, symptomatic or H/H drops below 7/21.         VANESSA (acute kidney injury)  Patient with acute kidney injury/acute renal failure. VANESSA is currently Improving. Baseline creatinine unknown - Labs reviewed- Renal function/electrolytes with Estimated Creatinine Clearance: 34.3 mL/min (A) (based on SCr of 1.5 mg/dL (H)). according to latest data. Monitor urine output and serial BMP and adjust therapy as needed. Avoid nephrotoxins and renally dose meds for GFR listed above.    S/P CABG x 3  Noted, on tele      Pacemaker  Noted, on tele      Mixed hyperlipidemia   Patient is  chronically on statin.will continue for now. Monitor clinically. Last LDL was   Lab Results   Component Value Date    LDLCALC 56.0 (L) 08/27/2023            Longstanding persistent atrial fibrillation  Patient with Persistent (7 days or more) atrial fibrillation which is controlled currently with Beta Blocker. Patient is currently in atrial fibrillation.[id number] Score: 3. Anticoagulation held per neuro, resume when appropriate    Hypertension  Uncontrolled.  Resume Metoprolol and Diovan. Telemonitoring.     Will utilize p.r.n. blood pressure medication only if patient's blood pressure greater than 220/110 and he develops symptoms such as worsening chest pain or shortness of breath.        Gastroesophageal reflux disease  Noted, chronic  ppi daily      Bilateral carotid artery occlusion  Noted, chronic  US bilateral carotids results reviewed.      Coronary arteriosclerosis in native artery  Patient with known CAD s/p CABG, which is controlled Will continue ASA, Plavix and Statin and monitor for S/Sx of angina/ACS. Continue to monitor on telemetry.       Discussed with patient and family members.  Answered all questions.  At patient's request, call patient's cardiologist office who is expected to call us back.    VTE Risk Mitigation (From admission, onward)         Ordered     apixaban tablet 5 mg  2 times daily         08/28/23 0915     IP VTE HIGH RISK PATIENT  Once         08/27/23 0016     Place sequential compression device  Until discontinued         08/27/23 0016     Reason for No Pharmacological VTE Prophylaxis  Once        Question:  Reasons:  Answer:  Already adequately anticoagulated on oral Anticoagulants    08/27/23 0016                Discharge Planning   LETICIA:  8/29/23    Code Status: Full Code   Is the patient medically ready for discharge?:     Reason for patient still in hospital (select all that apply): Patient trending condition and Consult recommendations  Discharge Plan A: Home with family                   Melva Toscano MD  Department of Hospital Medicine   [place] [facility]/Surg

## 2023-08-29 VITALS
RESPIRATION RATE: 17 BRPM | DIASTOLIC BLOOD PRESSURE: 68 MMHG | TEMPERATURE: 98 F | BODY MASS INDEX: 26.51 KG/M2 | HEART RATE: 56 BPM | WEIGHT: 168.88 LBS | SYSTOLIC BLOOD PRESSURE: 148 MMHG | OXYGEN SATURATION: 97 % | HEIGHT: 67 IN

## 2023-08-29 LAB
ALBUMIN SERPL BCP-MCNC: 3.5 G/DL (ref 3.5–5.2)
ALP SERPL-CCNC: 90 U/L (ref 55–135)
ALT SERPL W/O P-5'-P-CCNC: 14 U/L (ref 10–44)
ANION GAP SERPL CALC-SCNC: 9 MMOL/L (ref 8–16)
AST SERPL-CCNC: 15 U/L (ref 10–40)
BASOPHILS # BLD AUTO: 0 K/UL (ref 0–0.2)
BASOPHILS NFR BLD: 0 % (ref 0–1.9)
BILIRUB SERPL-MCNC: 0.4 MG/DL (ref 0.1–1)
BUN SERPL-MCNC: 28 MG/DL (ref 8–23)
CALCIUM SERPL-MCNC: 8.7 MG/DL (ref 8.7–10.5)
CHLORIDE SERPL-SCNC: 109 MMOL/L (ref 95–110)
CO2 SERPL-SCNC: 21 MMOL/L (ref 23–29)
CREAT SERPL-MCNC: 1.5 MG/DL (ref 0.5–1.4)
DIFFERENTIAL METHOD: ABNORMAL
EOSINOPHIL # BLD AUTO: 0 K/UL (ref 0–0.5)
EOSINOPHIL NFR BLD: 0.1 % (ref 0–8)
ERYTHROCYTE [DISTWIDTH] IN BLOOD BY AUTOMATED COUNT: 15.9 % (ref 11.5–14.5)
EST. GFR  (NO RACE VARIABLE): 46 ML/MIN/1.73 M^2
GLUCOSE SERPL-MCNC: 87 MG/DL (ref 70–110)
HCT VFR BLD AUTO: 38.7 % (ref 40–54)
HGB BLD-MCNC: 12.6 G/DL (ref 14–18)
IMM GRANULOCYTES # BLD AUTO: 0.03 K/UL (ref 0–0.04)
IMM GRANULOCYTES NFR BLD AUTO: 0.4 % (ref 0–0.5)
LYMPHOCYTES # BLD AUTO: 0.7 K/UL (ref 1–4.8)
LYMPHOCYTES NFR BLD: 8.4 % (ref 18–48)
MCH RBC QN AUTO: 28.3 PG (ref 27–31)
MCHC RBC AUTO-ENTMCNC: 32.6 G/DL (ref 32–36)
MCV RBC AUTO: 87 FL (ref 82–98)
MONOCYTES # BLD AUTO: 0.8 K/UL (ref 0.3–1)
MONOCYTES NFR BLD: 9.3 % (ref 4–15)
NEUTROPHILS # BLD AUTO: 6.7 K/UL (ref 1.8–7.7)
NEUTROPHILS NFR BLD: 81.8 % (ref 38–73)
NRBC BLD-RTO: 0 /100 WBC
PLATELET # BLD AUTO: 100 K/UL (ref 150–450)
PMV BLD AUTO: 10.9 FL (ref 9.2–12.9)
POTASSIUM SERPL-SCNC: 4.2 MMOL/L (ref 3.5–5.1)
PROT SERPL-MCNC: 7.3 G/DL (ref 6–8.4)
RBC # BLD AUTO: 4.46 M/UL (ref 4.6–6.2)
SODIUM SERPL-SCNC: 139 MMOL/L (ref 136–145)
WBC # BLD AUTO: 8.13 K/UL (ref 3.9–12.7)

## 2023-08-29 PROCEDURE — 97116 GAIT TRAINING THERAPY: CPT | Mod: CQ

## 2023-08-29 PROCEDURE — G0378 HOSPITAL OBSERVATION PER HR: HCPCS

## 2023-08-29 PROCEDURE — 36415 COLL VENOUS BLD VENIPUNCTURE: CPT | Performed by: NURSE PRACTITIONER

## 2023-08-29 PROCEDURE — 80053 COMPREHEN METABOLIC PANEL: CPT | Performed by: NURSE PRACTITIONER

## 2023-08-29 PROCEDURE — 25000003 PHARM REV CODE 250: Performed by: INTERNAL MEDICINE

## 2023-08-29 PROCEDURE — 85025 COMPLETE CBC W/AUTO DIFF WBC: CPT | Performed by: NURSE PRACTITIONER

## 2023-08-29 PROCEDURE — 25000003 PHARM REV CODE 250: Performed by: NURSE PRACTITIONER

## 2023-08-29 PROCEDURE — 97530 THERAPEUTIC ACTIVITIES: CPT | Mod: CQ

## 2023-08-29 RX ORDER — CLOPIDOGREL BISULFATE 75 MG/1
75 TABLET ORAL DAILY
Qty: 30 TABLET | Refills: 0 | Status: SHIPPED | OUTPATIENT
Start: 2023-08-30 | End: 2024-08-29

## 2023-08-29 RX ORDER — AMLODIPINE BESYLATE 5 MG/1
5 TABLET ORAL DAILY
Status: DISCONTINUED | OUTPATIENT
Start: 2023-08-29 | End: 2023-08-29 | Stop reason: HOSPADM

## 2023-08-29 RX ORDER — AMLODIPINE BESYLATE 5 MG/1
5 TABLET ORAL DAILY
Qty: 30 TABLET | Refills: 0 | Status: SHIPPED | OUTPATIENT
Start: 2023-08-30 | End: 2024-08-29

## 2023-08-29 RX ADMIN — CLOPIDOGREL BISULFATE 75 MG: 75 TABLET, FILM COATED ORAL at 08:08

## 2023-08-29 RX ADMIN — AMLODIPINE BESYLATE 5 MG: 5 TABLET ORAL at 10:08

## 2023-08-29 RX ADMIN — DOCUSATE SODIUM AND SENNOSIDES 1 TABLET: 8.6; 5 TABLET, FILM COATED ORAL at 08:08

## 2023-08-29 RX ADMIN — VALSARTAN 320 MG: 80 TABLET, FILM COATED ORAL at 08:08

## 2023-08-29 RX ADMIN — ASPIRIN 81 MG: 81 TABLET, COATED ORAL at 08:08

## 2023-08-29 RX ADMIN — APIXABAN 5 MG: 2.5 TABLET, FILM COATED ORAL at 08:08

## 2023-08-29 RX ADMIN — ACETAMINOPHEN 650 MG: 325 TABLET, FILM COATED ORAL at 03:08

## 2023-08-29 RX ADMIN — METOPROLOL TARTRATE 25 MG: 25 TABLET, FILM COATED ORAL at 08:08

## 2023-08-29 NOTE — DISCHARGE SUMMARY
"[facility] Medicine  Discharge Summary      Patient Name: Min Castro  MRN: [id number]  VAN: [id number]  Patient Class: OP- Observation  Admission Date: 8/27/2023  Hospital Length of Stay: 0 days  Discharge Date and Time:  08/29/2023 1:35 PM  Attending Physician: Melva Toscano MD   Discharging Provider: Melva Toscano MD  Primary Care Provider: Ray Galdamez MD    Primary Care Team: Networked reference to record PCT     HPI:   Min Castro is an 83 year old male with a past medical history of CAD with CABG x 3, HLD, pacemaker and carotid stenosis, who presents as a transfer from [facility] to Critical access hospital for neurology services.   Per  (Dr. Maurer):  "Mr. Castro is an 84yo man with a past medical history of CAD, CABG x 3, Dressler's post CABG, asbestosis, HLD, pacemaker, and bilateral carotid artery stenosis.    Per sending MD, "here with medics who were called because the patient developed acute left-sided weakness and slurred speech.  Last known normal 15:50.  Patient reports he was working outside on a fence when he felt a little funny and had family and friends  on his left-sided weakness.  No history of stroke.  Patient does report having 1 beer earlier today while outside working.  No focal pain currently.  No nausea or vomiting."    In the ED his VS are BP (!) 127/49   Pulse 65   Temp 97.8 °F (36.6 °C)   Resp 16   Ht 5' 7" (1.702 m)   Wt 75.8 kg (167 lb)   SpO2 96%   BMI 26.16 kg/m².    VS since arrival are:  Temp:  [97.8 °F (36.6 °C)] 97.8 °F (36.6 °C)  Pulse:  [63-65] 65  Resp:  [16-20] 16  SpO2:  [96 %-98 %] 96 %  BP: (127-148)/(49-76) 127/49     Labs showed:  HG 12.5, , INR 1.2  CR 2, K 4, CA 8.6, ALB 3.6, NML LFT  ETOH < 10, UA NEG  No old labs for comparison    NC CT HEAD:  Ventricles and sulci are normal in size for age without evidence of hydrocephalus.   No extra-axial blood or fluid collections.  The brain " parenchyma appears normal.   No parenchymal mass, hemorrhage, edema or major vascular distribution infarct.    Skull/extracranial contents (limited evaluation): No fracture.   Mastoid air cells and paranasal sinuses are essentially clear.     Impression:   No acute abnormality.    In the ED he was treated with:  Medications  sodium chloride 0.9% bolus 500 mL 500 mL (500 mLs Intravenous New Bag 8/26/23 1724)  0.9%  NaCl infusion (has no administration in time range)  0.9%  NaCl infusion (0 mLs Intravenous Stopped 8/26/23 1801)  aspirin tablet 325 mg (325 mg Oral Given 8/26/23 1658)    Per tele-Neuro stroke consult by Dr. Acuna:  TIA (transient ischemic attack)  Majority of symptoms are now resolved (Left face, dysarthria)  Some subtle e/o sensory extension on left which is not 100% present on multiple exams.  Plan on stat CTA @ spoke to r/o high risk vascular lesion requiring intervention or further monitoring and transfer.  Load aspirin 325 mg & clopidogrel 300 mg x 1 now, followed by daily aspirin 81 mg /  clopidogrel 75 mg x 30 days followed by monotherapy therafter  Recommend:  - MRI brain w/o contrast to evaluate for presence and characterization of infarct  - TTE   - lipid profile and A1c for evaluation of modifiable risk factors  - PT/OT/SLP eval and Rx  - Permissive HTN < 220/120 mmHg x48-72h pending results of vessel imaging    ADDENDUM:  On further review, patient noted to have pacemaker in place, so he cannot have MRI at [facility].  His EKG shows atrial fibrillation, a diagnosis not mentioned in Care Everywhere.  I reached back out to Neuro on call, Dr. Silva.  He felt not being able to get MRI would not  with an NIH stroke scale of 1, so he was okay with the patient going to a [facility] hospital and not performing MRI.  Regarding his Afib, Dr. Silva recommended bridging with ASA 325mg and Plavix 300mg as noted above, until formal Neuro and Cards consults could be obtained  "tomorrow at [place]."    Patient arrived to [facility] without incident. He states that he feels much better now and denies any complaint. He states that at home, he didn't realize anything was happening to him at first, but his family noticed his face become distorted with a left sided facial droop. He states he felt like he couldn't get his thoughts together and was having difficulty expressing himself verbally, then noticed his speech was slurred. All of those symptoms have since resolved. He states he recently had bilateral carotid ultrasounds and an echo performed, but it is not able to be accessed in [facility]. Hospital medicine consulted for admission and further management.      * No surgery found *      Hospital Course:   Patient placed on tele-monitoring and routine neuro-checks. Neuro-imaging reviewed, results below. Patient evaluated by neurology team.   No evidence of acute CVA noted.  Patient noted to have uncontrolled hypertension for which antihypertensive medication was adjusted.  We attempted to reach patient's cardiologist for discussion as well.   Patient to maintain daily blood pressure log.  Patient educated to continue Eliquis and Plavix use as per directions by neurologist. Patient worked with ST/PT/OT. Symptoms improved.  Patient will continue close follow-up with primary care physician, cardiologist and neurologist.  Discharge plan of care reviewed with patient and family members.      Goals of Care Treatment Preferences:  Code Status: Full Code      Consults:   Consults (From admission, onward)          Status Ordering Provider     Inpatient consult to Neurology  Once        Provider:  Jake Stevens MD    Completed ARABELLA CHAVARRIA     Inpatient consult to Physical Medicine Rehab  Once        Provider:  (Not yet assigned)    Acknowledged ARABELLA CHAVARRIA     Inpatient consult to Registered Dietitian/Nutritionist  Once        Provider:  (Not yet assigned)    Completed ARABELLA CHAVARRIA     IP consult to " case management/social work  Once        Provider:  (Not yet assigned)    ARABELLA Meehan          Microbiology Results (last 7 days)       ** No results found for the last 168 hours. **          ECHO with bubble study: Patient refused as he has had ECHO 2 weeks ago by his cardiologist.      US Carotid arteries:  The patient has a patent right internal carotid arterial stent.  There is less than 50% stenosis of the left internal carotid artery.  Flow within the right vertebral artery is retrograde.  This may signify subclavian stenosis.  The low within the left vertebral artery was not seen in this may be occluded.     CT Head:  No acute abnormality.    CTA Head and Neck:   1. Intracranial and extracranial atherosclerotic changes most notable for mild to moderate stenosis at the origin of the great vessels of the aortic arch without flow-limiting stenosis.  Additional moderate stenosis at the origin of the right vertebral artery.  2. Patent right common/internal carotid artery stent.  3. No significant intracranial arterial stenosis.  No proximal large vessel occlusion.  4. No acute intracranial CT findings.    Final Active Diagnoses:    Diagnosis Date Noted POA    PRINCIPAL PROBLEM:  TIA (transient ischemic attack) [G45.9] 08/26/2023 Yes    VANESSA (acute kidney injury) [N17.9] 08/27/2023 Yes    Anemia [D64.9] 08/27/2023 Yes    Thrombocytopenia [D69.6] 08/27/2023 Yes    S/P CABG x 3 [Z95.1] 08/27/2023 Not Applicable    Coronary arteriosclerosis in native artery [I25.10] 08/26/2023 Yes    Bilateral carotid artery occlusion [I65.23] 08/26/2023 Yes    Gastroesophageal reflux disease [K21.9] 08/26/2023 Yes    Hypertension [I10] 08/26/2023 Yes    Longstanding persistent atrial fibrillation [I48.11] 08/26/2023 Yes    Mixed hyperlipidemia [E78.2] 08/26/2023 Yes    Pacemaker [Z95.0] 08/26/2023 Yes      Problems Resolved During this Admission:       Discharged Condition: good    Disposition: Home or Self Care    Follow  Up:   Follow-up Information       Ray Galdamez MD Follow up.    Specialty: Family Medicine  Contact information:  [address]  [id number]  [place] [address]  [phone]               Ray Galdamez MD Follow up in 1 week(s).    Specialty: Family Medicine  Contact information:  [address]  [id number]  [place] [address]  [phone]               Kasia Truong MD Follow up in 1 week(s).    Specialty: Neurology  Contact information:  [address] [facility] [address]  [phone]                           Patient Instructions:      Ambulatory referral/consult to Home Health   Standing Status: Future   Referral Priority: Routine Referral Type: Home Health   Referral Reason: Specialty Services Required   Requested Specialty: Home Health Services   Number of Visits Requested: 1     Diet Cardiac   Order Comments: See Stroke Patient Education Guide Booklet for details.     Call 911 for any of the following:   Order Comments: Call 911  right away if any of the following warning signs come on suddenly, even if the symptoms only last for a few minutes. With stroke, timing is very important.   - Warning Signs of Stroke:  - Weakness: You may feel a sudden weakness, tingling or loss of feeling on one side of your face or body.  - Vision Problems: You may have sudden double vision or trouble seeing in one or both eyes.  - Speech Problems: You may have sudden trouble talking, slured speech, or problems understanding others.  - Headache: You may have sudden, severe headache.  - Movement Problems: You may experience dizziness, a feeling of spinning, a loss of balance, a feeling of falling or blackouts.     Activity as tolerated   Order Comments: Fall precautions       Significant Diagnostic Studies: Labs:   CMP   Recent Labs   Lab 08/28/23  0455 08/29/23  0512    139   K 4.4 4.2    109   CO2 20* 21*   * 87   BUN 24* 28*   CREATININE 1.5* 1.5*   CALCIUM 8.8 8.7   PROT 7.7 7.3   ALBUMIN  3.5 3.5   BILITOT 0.7 0.4   ALKPHOS 90 90   AST 13 15   ALT 12 14   ANIONGAP 11 9    and CBC   Recent Labs   Lab 08/28/23  0455 08/29/23  0512   WBC 5.24 8.13   HGB 13.3* 12.6*   HCT 40.9 38.7*   * 100*       Pending Diagnostic Studies:       None           Medications:  Reconciled Home Medications:      Medication List        START taking these medications      amLODIPine 5 MG tablet  Commonly known as: NORVASC  Take 1 tablet (5 mg total) by mouth once daily.  Start taking on: August 30, 2023     apixaban 5 mg Tab  Commonly known as: ELIQUIS  Take 1 tablet (5 mg total) by mouth 2 (two) times daily.     clopidogreL 75 mg tablet  Commonly known as: PLAVIX  Take 1 tablet (75 mg total) by mouth once daily.  Start taking on: August 30, 2023            CONTINUE taking these medications      ALPRAZolam 1 MG Tb24  Commonly known as: XANAX XR  Take by mouth once daily.     atorvastatin 40 MG tablet  Commonly known as: LIPITOR  Take 40 mg by mouth once daily.     b complex vitamins capsule  Take 1 capsule by mouth once daily.     diphenhydrAMINE-acetaminophen  mg Tab  Commonly known as: TYLENOL PM  Take 1 tablet by mouth nightly as needed.     metoprolol tartrate 25 MG tablet  Commonly known as: LOPRESSOR  Take 25 mg by mouth 2 (two) times daily.     pantoprazole 40 MG tablet  Commonly known as: PROTONIX  Take 40 mg by mouth once daily.     valsartan 320 MG tablet  Commonly known as: DIOVAN  Take 320 mg by mouth once daily.            STOP taking these medications      levoFLOXacin 250 MG tablet  Commonly known as: LEVAQUIN              Indwelling Lines/Drains at time of discharge:   Lines/Drains/Airways       None                   Time spent on the discharge of patient: 34 minutes         Melva Toscano MD  Department of Hospital Medicine  [facility]/Surg

## 2023-08-29 NOTE — SUBJECTIVE & OBJECTIVE
Interval History:  Patient's blood pressure remaining high.  Patient denies any new focal neuro deficits.  Denies any headache vision changes speech dysfunction or any facial asymmetry.  Patient's wife and daughter are present at bedside.    Review of Systems   Constitutional:  Negative for chills and fever.   HENT:  Negative for congestion and sore throat.    Eyes:  Negative for visual disturbance.   Respiratory:  Negative for cough and shortness of breath.    Cardiovascular:  Negative for chest pain and palpitations.   Gastrointestinal:  Negative for abdominal pain, constipation, diarrhea, nausea and vomiting.   Endocrine: Negative for cold intolerance and heat intolerance.   Genitourinary:  Negative for dysuria and hematuria.   Musculoskeletal:  Negative for arthralgias and myalgias.   Skin:  Negative for rash.   Neurological:  Positive for facial asymmetry and speech difficulty. Negative for tremors and seizures.   Hematological:  Negative for adenopathy. Does not bruise/bleed easily.   All other systems reviewed and are negative.    Objective:     Vital Signs (Most Recent):  Temp: 97 °F (36.1 °C) (08/29/23 0719)  Pulse: (!) 52 (08/29/23 0719)  Resp: 17 (08/29/23 0719)  BP: (!) 193/80 (08/29/23 0719)  SpO2: 97 % (08/29/23 0719) Vital Signs (24h Range):  Temp:  [97 °F (36.1 °C)-98.1 °F (36.7 °C)] 97 °F (36.1 °C)  Pulse:  [52-69] 52  Resp:  [17-18] 17  SpO2:  [96 %-98 %] 97 %  BP: (135-193)/(63-84) 193/80     Weight: 76.6 kg (168 lb 14 oz)  Body mass index is 26.45 kg/m².    Intake/Output Summary (Last 24 hours) at 8/29/2023 0946  Last data filed at 8/29/2023 0600  Gross per 24 hour   Intake 240 ml   Output 1175 ml   Net -935 ml           Physical Exam  Vitals and nursing note reviewed.   Constitutional:       General: He is awake. He is not in acute distress.     Appearance: Normal appearance. He is well-developed. He is not ill-appearing.   HENT:      Head: Normocephalic and atraumatic.      Nose: Nose normal.  No septal deviation.   Eyes:      Conjunctiva/sclera: Conjunctivae normal.      Pupils: Pupils are equal, round, and reactive to light.   Neck:      Thyroid: No thyroid mass.      Vascular: No JVD.      Trachea: No tracheal tenderness or tracheal deviation.   Cardiovascular:      Rate and Rhythm: Normal rate and regular rhythm.      Heart sounds: Normal heart sounds, S1 normal and S2 normal. No murmur heard.     No friction rub. No gallop.   Pulmonary:      Effort: Pulmonary effort is normal.      Breath sounds: Normal breath sounds. No decreased breath sounds, wheezing, rhonchi or rales.   Abdominal:      General: Bowel sounds are normal. There is no distension.      Palpations: Abdomen is soft. There is no hepatomegaly, splenomegaly or mass.      Tenderness: There is no abdominal tenderness.   Skin:     General: Skin is warm.      Findings: No rash.   Neurological:      General: No focal deficit present.      Mental Status: He is alert and oriented to person, place, and time. Mental status is at baseline.      GCS: GCS eye subscore is 4. GCS verbal subscore is 5. GCS motor subscore is 6.      Cranial Nerves: Cranial nerves 2-12 are intact. No cranial nerve deficit.      Sensory: Sensation is intact. No sensory deficit.      Motor: Motor function is intact.   Psychiatric:         Mood and Affect: Mood normal.         Behavior: Behavior normal. Behavior is cooperative.             Significant Labs: All pertinent labs within the past 24 hours have been reviewed.  CBC:   Recent Labs   Lab 08/28/23 0455 08/29/23 0512   WBC 5.24 8.13   HGB 13.3* 12.6*   HCT 40.9 38.7*   * 100*       CMP:   Recent Labs   Lab 08/28/23 0455 08/29/23 0512    139   K 4.4 4.2    109   CO2 20* 21*   * 87   BUN 24* 28*   CREATININE 1.5* 1.5*   CALCIUM 8.8 8.7   PROT 7.7 7.3   ALBUMIN 3.5 3.5   BILITOT 0.7 0.4   ALKPHOS 90 90   AST 13 15   ALT 12 14   ANIONGAP 11 9       Lipid Panel:   No results for input(s):  ""CHOL", "HDL", "LDLCALC", "TRIG", "CHOLHDL" in the last 48 hours.    Troponin: No results for input(s): "TROPONINI", "TROPONINIHS" in the last 48 hours.  TSH:   Recent Labs   Lab 08/27/23  0516   TSH 2.338       Urine Studies:   No results for input(s): "COLORU", "APPEARANCEUA", "PHUR", "SPECGRAV", "PROTEINUA", "GLUCUA", "KETONESU", "BILIRUBINUA", "OCCULTUA", "NITRITE", "UROBILINOGEN", "LEUKOCYTESUR", "RBCUA", "WBCUA", "BACTERIA", "SQUAMEPITHEL", "HYALINECASTS" in the last 48 hours.    Invalid input(s): "WRIGHTSUR"    Microbiology Results (last 7 days)       ** No results found for the last 168 hours. **        Significant Imaging:   ECHO with bubble study: Patient refused as he has had ECHO 2 weeks ago by his cardiologist.     US Carotid arteries:  The patient has a patent right internal carotid arterial stent.  There is less than 50% stenosis of the left internal carotid artery.  Flow within the right vertebral artery is retrograde.  This may signify subclavian stenosis.  The low within the left vertebral artery was not seen in this may be occluded.     CT Head:  No acute abnormality.    CTA Head and Neck:  1. Intracranial and extracranial atherosclerotic changes most notable for mild to moderate stenosis at the origin of the great vessels of the aortic arch without flow-limiting stenosis.  Additional moderate stenosis at the origin of the right vertebral artery.  2. Patent right common/internal carotid artery stent.  3. No significant intracranial arterial stenosis.  No proximal large vessel occlusion.  4. No acute intracranial CT findings  "

## 2023-08-29 NOTE — PLAN OF CARE
"Pt cleared for discharge from case management    Provided HH list and pt selected [facility] and wife signed choice form.  Sent order via [employer]    Called to schedule HFU appt raven/ RAUL Galdamez MD, spoke to Leyda, their "system is down" take pt's information and will call pt with appt.     08/29/23 0104   Final Note   Assessment Type Final Discharge Note   Anticipated Discharge Disposition Home-Health   What phone number can be called within the next 1-3 days to see how you are doing after discharge?   ([phone])   Post-Acute Status   Post-Acute Authorization Home Health   Home Health Status Referrals Sent   Patient choice form signed by patient/caregiver List from System Post-Acute Care       "

## 2023-08-29 NOTE — PLAN OF CARE
Pt reported during rounding that Eliquis cost $800 and can't afford.    Called pt's pharmacy, [facility] [phone], spoke to Cynthia and she stated pt last filled Eliquis 7/11/22, at a cost of $47.    [phone] spoke to Sola and Eliquis is a Tier 2 drug doesn't require auth and cost for pt would be $47 retail (2.5mg 60 tablets/30day or 5mg 60 tabs/30day), call reference # [id number]     08/29/23 0849   Post-Acute Status   Post-Acute Authorization Medications

## 2023-08-29 NOTE — PLAN OF CARE
Problem: Physical Therapy  Goal: Physical Therapy Goal  Description: Goals to be met by: 9/15/2023     Patient will increase functional independence with mobility by performin). Supine to sit with Modified [place]  2). Sit to supine with Modified [place]  3). Sit to stand transfer with Modified [place]  4). Gait  x > 100 feet with Modified [place]     Outcome: Ongoing, Progressing   Ambulate with rw and assistance for safety.

## 2023-08-29 NOTE — PLAN OF CARE
Problem: Adult Inpatient Plan of Care  Goal: Plan of Care Review  Outcome: Ongoing, Progressing  Goal: Patient-Specific Goal (Individualized)  Outcome: Ongoing, Progressing  Goal: Absence of Hospital-Acquired Illness or Injury  Outcome: Ongoing, Progressing  Goal: Optimal Comfort and Wellbeing  Outcome: Ongoing, Progressing  Goal: Readiness for Transition of Care  Outcome: Ongoing, Progressing     Problem: Adjustment to Illness (Stroke, Ischemic/Transient Ischemic Attack)  Goal: Optimal Coping  Outcome: Ongoing, Progressing     Problem: Bowel Elimination Impaired (Stroke, Ischemic/Transient Ischemic Attack)  Goal: Effective Bowel Elimination  Outcome: Ongoing, Progressing     Problem: Communication Impairment (Stroke, Ischemic/Transient Ischemic Attack)  Goal: Improved Communication Skills  Outcome: Ongoing, Progressing   Plan of care reviewed with patient,verbalized understanding.  Afib on telemetry. Neuro checks as per orders. Remains free from falls, injury. Instructed to call for assistance as needed ,verbalized understanding. Bed in low & locked position. Call light in reach, bed alarm on .

## 2023-08-29 NOTE — PT/OT/SLP PROGRESS
Physical Therapy Treatment    Patient Name:  Min Castro   MRN:  [id number]    Recommendations:     Discharge Recommendations: home health PT  Discharge Equipment Recommendations: none (has cane and walker)  Barriers to discharge: None    Assessment:     Min Castro is a 83 y.o. male admitted with a medical diagnosis of TIA (transient ischemic attack).  He presents with the following impairments/functional limitations: weakness, impaired endurance, impaired self care skills, impaired functional mobility, gait instability, decreased lower extremity function . Supine in bed . Agreed to mobilize.  Reports having had a rough night due to neuropathy but no pain at this time.  Transferred EOB with SBA. Ambulated 250' with rw and CGA. Returned to room to restroom. On / off commode with SBA. Performed hygiene post BM without difficulty.  Sat up in chair at bedside.     Rehab Prognosis: Good; patient would benefit from acute skilled PT services to address these deficits and reach maximum level of function.    Recent Surgery: * No surgery found *      Plan:     During this hospitalization, patient to be seen 6 x/week to address the identified rehab impairments via gait training, therapeutic activities, therapeutic exercises and progress toward the following goals:    Plan of Care Expires:  09/15/23    Subjective     Chief Complaint: neuropathy  Patient/Family Comments/goals: to return home with family  Pain/Comfort:  Pain Rating 1: 0/10      Objective:     Communicated with nurse Aparicio prior to session.  Patient found supine with bed alarm, telemetry upon PT entry to room.     General Precautions: Standard, fall, pacemaker, hearing impaired  Orthopedic Precautions: N/A  Braces: N/A  Respiratory Status: Room air     Functional Mobility:  Bed Mobility:     Rolling Right: stand by assistance  Supine to Sit: stand by assistance  Transfers:     Sit to Stand:  contact guard assistance with rolling walker  Gait: 250' with rw  and CGA.      AM-PAC 6 CLICK MOBILITY          Treatment & Education:  Required A for socks in supine.    Transferred EOB with SBA, sta briefly.  Sit > stand with rw and CGA.  Ambulated 250' with rw and CGA.  Returned to room, ambulated to restroom. On / off commode with SBA. Performed hygiene post BM.   Ambulated to chair at bedside.    Patient left up in chair with all lines intact, call button in reach, chair alarm on, nurse Alessandra notified, and family present..    GOALS:   Multidisciplinary Problems       Physical Therapy Goals          Problem: Physical Therapy    Goal Priority Disciplines Outcome Goal Variances Interventions   Physical Therapy Goal     PT, PT/OT Ongoing, Progressing     Description: Goals to be met by: 9/15/2023     Patient will increase functional independence with mobility by performin). Supine to sit with Modified [place]  2). Sit to supine with Modified [place]  3). Sit to stand transfer with Modified [place]  4). Gait  x > 100 feet with Modified [place]                          Time Tracking:     PT Received On: 23  PT Start Time: 30     PT Stop Time: 0953  PT Total Time (min): 23 min     Billable Minutes: Gait Training 10min and Therapeutic Activity 13min    Treatment Type: Treatment  PT/PTA: PTA     Number of PTA visits since last PT visit: 2     2023

## 2023-08-29 NOTE — PROGRESS NOTES
"               [facility]  Department of Neurology  Neurology Progress Note        PATIENT NAME: Min Castro  MRN: [id number]  CSN: [id number]      TODAY'S DATE: 08/29/2023  ADMIT DATE: 8/27/2023                            CONSULTING PROVIDER: Amisha Britton DNP  CONSULT REQUESTED BY: Melva Toscano MD      Reason for consult: TIA       History obtained from chart review and the patient.    HPI per EMR: Min Castro is a 83 y.o. male with a history of CAD with CABG x 3, HLD, pacemaker and carotid stenosis, who presents as a transfer from [facility].   Per  (Dr. Maurer):  "Mr. Castro is an 84yo man with a past medical history of CAD, CABG x 3, Dressler's post CABG, asbestosis, HLD, pacemaker, and bilateral carotid artery stenosis.     Per sending MD, "here with medics who were called because the patient developed acute left-sided weakness and slurred speech.  Last known normal 15:50.  Patient reports he was working outside on a fence when he felt a little funny and had family and friends  on his left-sided weakness.  No history of stroke.  Patient does report having 1 beer earlier today while outside working.  No focal pain currently.  No nausea or vomiting."    Neurology consult: Patient was seen and examined by me. He presented with facial droop and speech trouble with aphasia and dysarthria. No associated weakness or sensory changes however there was some reported L side weakness. He said he was working in his yard but he was in shade doing this. Denies any prior history of stroke.      Currently he feels back to baseline and denies any symptoms. CTH was done and negative for acute pathology. CTA was not done due to contrast allergy.     8/28/23: Patient seen and examined while sitting up in chair at bedside. POC reviewed with Dr. Truong. CUS results reviewed with patient and explained that left vertebral artery not " clearly seen and retrograde flow noted to right vertebral artery therefore would like to obtain CTA head and neck after premedicating with Benadryl due to contrast allergy.     Of not, patient has h/o of atrial fibrillation and was advised to take Eliquis daily for anticoagulation. He reports he stopped taking this medication one year ago due to being unable to afford it. He reports cardiologist Dr Shah started him on clopidogrel 75 mg once daily in place of the eliquis. He reports carotid artery stent placed by vascular surgeon Dr Hahn at [facility] about 4 to 5 months ago. He reports after stent placement he was advised to add aspirin 81 mg. So, for the previous 4 to 5 months he has been on DAPT with aspirin 81 mg and plavix 75 mg daily but has not been on any anticoagulation for the last year.     8/29: Patient seen and examined with Dr Truong. Patient is sitting up in chair at bedside. He is alert and oriented, family in room. He reports he is doing well. No acute events overnight.     PREVIOUS MEDICAL HISTORY:  Past Medical History:   Diagnosis Date    Coronary artery disease     Hypertension      PREVIOUS SURGICAL HISTORY:  History reviewed. No pertinent surgical history.  FAMILY MEDICAL HISTORY:  Family History   Family history unknown: Yes     SOCIAL HISTORY:  Social History     Tobacco Use    Smoking status: Never    Smokeless tobacco: Never   Substance Use Topics    Alcohol use: Yes     Comment: occasional    Drug use: Never     ALLERGIES:  Review of patient's allergies indicates:   Allergen Reactions    Iodinated contrast media     Contrast media Other (See Comments)     HOME MEDICATIONS:  Prior to Admission medications    Medication Sig Start Date End Date Taking? Authorizing Provider   ALPRAZolam (XANAX XR) 1 MG Tb24 Take by mouth once daily.   Yes Provider, Historical   atorvastatin (LIPITOR) 40 MG tablet Take 40 mg by mouth once daily.   Yes Provider, Historical   b complex vitamins  capsule Take 1 capsule by mouth once daily.   Yes Provider, Historical   diphenhydrAMINE-acetaminophen (TYLENOL PM)  mg Tab Take 1 tablet by mouth nightly as needed.   Yes Provider, Historical   metoprolol tartrate (LOPRESSOR) 25 MG tablet Take 25 mg by mouth 2 (two) times daily.   Yes Provider, Historical   pantoprazole (PROTONIX) 40 MG tablet Take 40 mg by mouth once daily.   Yes Provider, Historical   valsartan (DIOVAN) 320 MG tablet Take 320 mg by mouth once daily.   Yes Provider, Historical   levoFLOXacin (LEVAQUIN) 250 MG tablet Take 250 mg by mouth once daily.    Provider, Historical     CURRENT SCHEDULED MEDICATIONS:   apixaban  5 mg Oral BID    aspirin  81 mg Oral Daily    clopidogreL  75 mg Oral Daily    metoprolol tartrate  25 mg Oral BID    pantoprazole  40 mg Oral Daily    senna-docusate 8.6-50 mg  1 tablet Oral BID    valsartan  320 mg Oral Daily     CURRENT INFUSIONS:   sodium chloride 0.9%       CURRENT PRN MEDICATIONS:  acetaminophen, hydrALAZINE, labetaloL, magnesium oxide, magnesium oxide, ondansetron, potassium bicarbonate, potassium bicarbonate, potassium bicarbonate, potassium, sodium phosphates, potassium, sodium phosphates, potassium, sodium phosphates, sodium chloride 0.9%, sodium chloride 0.9%    REVIEW OF SYSTEMS:  Please refer to the HPI for all pertinent positive and negative findings. A comprehensive review of all other systems was negative.       PHYSICAL EXAM:  Patient Vitals for the past 24 hrs:   BP Temp Temp src Pulse Resp SpO2   08/29/23 0719 (!) 193/80 97 °F (36.1 °C) Oral (!) 52 17 97 %   08/29/23 0600 (!) 150/80 -- -- -- -- --   08/29/23 0541 (!) 186/84 97.6 °F (36.4 °C) Oral (!) 53 18 96 %   08/29/23 0202 (!) 181/79 97.5 °F (36.4 °C) Oral (!) 54 18 98 %   08/28/23 2302 (!) 174/73 97.6 °F (36.4 °C) Oral (!) 56 18 96 %   08/28/23 2009 -- -- -- -- -- 97 %   08/28/23 2002 (!) 150/67 97.5 °F (36.4 °C) Oral 63 18 97 %   08/28/23 1621 (!) 165/69 97.8 °F (36.6 °C) Oral (!) 58 17  96 %   08/28/23 1246 -- 98.1 °F (36.7 °C) -- -- -- --   08/28/23 1244 135/63 98 °F (36.7 °C) Oral 67 17 96 %   08/28/23 1018 (!) 189/84 98 °F (36.7 °C) -- 69 17 96 %       GENERAL APPEARANCE: Alert, well-developed, well-nourished male in no acute distress.  HEENT: Normocephalic and atraumatic. PERRL. Oropharynx unremarkable.  PULM: Normal respiratory effort. No accessory muscle use.  CV: RRR.  ABDOMEN: Soft, nontender.  EXTREMITIES: No obvious signs of vascular compromise. Pulses present. No cyanosis, clubbing or edema.  SKIN: Clear; no rashes, lesions or skin breaks in exposed areas.    NEURO:  MENTAL STATUS: Patient awake and oriented to time, place, and person, recent/remote memory normal, attention span/concentration normal, and speech fluent without paraphasic errors.  Affect euthymic.    CRANIAL NERVES:  CN I: Not tested.  CN II: Fundoscopic exam deferred.  CN III, IV, VI: Pupils equal, round and reactive to light.  Extraocular movements full and intact.  CN V: Facial sensation normal.  CN VII: Facial asymmetry absent.  CN VIII: Hearing grossly normal and equal bilaterally.  No skew deviation or pathologic nystagmus.  CN IX, X: Palate elevates symmetrically. Speech/articulation is clear without dysarthria.  CN XI: Shoulder shrug and chin rotation equal with good strength.  CN XII: Tongue protrusion midline.    MOTOR:  Bulk normal. Tone normal and symmetric throughout.  Abnormal movements absent.  Tremor: none present.  Strength 5/5 throughout.    REFLEXES:  DTRs 2+ throughout.  Plantar response downgoing bilaterally.  SENSATION: grossly intact throughout.  COORDINATION: normal finger-to-nose.  STATION: not tested.  GAIT: not tested.      NIHSS:  1a      Level of Consciousness (alert, drowsy, etc.):   0=alert; keenly responsive  1b.     Level of Consciousness Questions (month, age): 0= able to answer both questions  1c.     Level of Consciousness Commands (open, close eyes, make fist, let go):  0=Answers both  tasks correctly  2.      Best Gaze (eyes open - patient follows examiner's finger or face):      0=normal  3.      Visual (introduce visual stimulus/threat to patient's visual field quadrants):  0=No visual loss  4.      Facial Palsy (show teeth, raise eyebrows and squeeze eyes shut):        0=Normal symmetric movement  5a.     Motor Arm - Left (elevate extremity 90 degrees and score drift/movement):       0=No drift, limb holds 90 (or 45) degrees for full 10 seconds  5b.     Motor Arm - Right (elevate extremity 90 degrees and score drift/movement):      0=No drift, limb holds 90 (or 45) degrees for full 10 seconds  6a.     Motor Leg - Left (elevate extremity 30 degrees and score drift/movement):       0=No drift, limb holds 90 (or 45) degrees for full 10 seconds  6b      Motor Leg - Right (elevate extremity 30 degrees and score drift/movement):      0=No drift, limb holds 90 (or 45) degrees for full 10 seconds  7.      Limb Ataxia (finger-nose, heel down shin):      0=Absent  8.      Sensory (pin prick to face, arm, trunk, and leg - compare side to side):        0=Normal; no sensory loss  9.      Best Language (name items, describe a picture and read sentences):      0=No aphasia, normal  10.     Dysarthria (evaluate speech clarity by patient repeating listed words): 0=Normal  11.     Extinction and Inattention (use prior testing to identify neglect or double simultaneous stimuli testing):      0=No abnormality          NIH Stroke Scale Total:         0      Labs:  Recent Labs   Lab 08/26/23  1650 08/28/23  0455 08/29/23  0512    139 139   K 4.0 4.4 4.2    108 109   CO2 20* 20* 21*   BUN 31* 24* 28*   CREATININE 2.0* 1.5* 1.5*   GLU 99 144* 87   CALCIUM 8.6* 8.8 8.7   PHOS  --  2.5*  --    MG 2.0 1.9  --      Recent Labs   Lab 08/26/23  1650 08/28/23  0455 08/29/23  0512   WBC 5.39 5.24 8.13   HGB 12.5* 13.3* 12.6*   HCT 39.2* 40.9 38.7*   * 114* 100*     Recent Labs   Lab 08/26/23  0331  "08/28/23  0455 08/29/23  0512   ALBUMIN 3.6 3.5 3.5   PROT 7.3 7.7 7.3   BILITOT 0.7 0.7 0.4   ALKPHOS 97 90 90   ALT 13 12 14   AST 15 13 15     Lab Results   Component Value Date    INR 1.1 08/27/2023     Lab Results   Component Value Date    TRIG 65 08/27/2023    HDL 41 08/27/2023    CHOLHDL 37.3 08/27/2023     Lab Results   Component Value Date    HGBA1C 5.9 08/27/2023     No results found for: "PROTEINCSF", "GLUCCSF", "WBCCSF"    Imaging:  I have reviewed and interpreted the pertinent imaging and lab results.      CTA Head and Neck (xpd)  Narrative: EXAMINATION:  CTA HEAD AND NECK (XPD)    CLINICAL HISTORY:  Transient ischemic attack (TIA);    TECHNIQUE:  Non contrast low dose axial images were obtained through the head. CT angiogram was performed from the level of the kimberli to the top of the head following the IV administration of 75mL of Omnipaque 350.   Sagittal and coronal reconstructions and maximum intensity projection reconstructions were performed. Arterial stenosis percentages are based on NASCET measurement criteria.    COMPARISON:  Head CT 08/26/2023.  Carotid ultrasound 08/27/2023.    FINDINGS:  CTA HEAD:    Vasculature: Mild atherosclerotic narrowing of the distal right vertebral artery and bilateral cavernous internal carotid arteries.  No other focal stenosis or proximal large vessel occlusion.There is no suspicion of vascular malformation or saccular aneurysm.    Variant anatomy: No variant anatomy is noted.    Brain: There is no evidence of mass, mass effect, edema, midline shift, intracranial hemorrhage, or space-occupying extra-axial fluid collection.  Grossly stable findings of age-related chronic small vessel ischemic changes.  After the administration of contrast there is no abnormal enhancement.    Ventricles/Sulci: The ventricles and sulci are appropriate in size for age.    Osseous Structures: The osseous structures are unremarkable in appearance.    Sinuses and orbits: No significant " opacification.  Ocular lens replacements.  No other focal orbital abnormality.    Other: Visualized portions of the mastoids are unremarkable.    CTA NECK:    Aorta: Conventional branching pattern. The great vessel artery origins are patent noting mild to moderate focal stenosis of the proximal left common carotid and bilateral subclavian arteries.    Vertebral Arteries: Moderate stenosis of the origin of the right vertebral artery.  The origin of the left vertebral artery is patent.  Both arteries are code dominant.  No other significant stenosis of the cervical vertebral arteries.  No dissection..    Right Carotid: There is a common/internal carotid artery stent which is patent noting mild atherosclerotic related narrowing at the ICA origin of less than 25% by NASCET criteria.  No dissection or occlusion.    Left Carotid: No flow limiting stenosis, occlusion, or dissection of the common carotid or internal carotid arteries. Mild plaque of the proximal ICA.  Right internal carotid artery: Less than 50 % stenosis by and NASCET.    Extravascular Anatomy: Partially imaged median sternotomy/CABG changes.  Emphysematous changes of the partially imaged lung apices.  Degenerative changes of the spine.  No bony destructive changes.  Impression: 1. Intracranial and extracranial atherosclerotic changes most notable for mild to moderate stenosis at the origin of the great vessels of the aortic arch without flow-limiting stenosis.  Additional moderate stenosis at the origin of the right vertebral artery.  2. Patent right common/internal carotid artery stent.  3. No significant intracranial arterial stenosis.  No proximal large vessel occlusion.  4. No acute intracranial CT findings    Electronically signed by: Wilmer Blanco  Date:    08/28/2023  Time:    16:04     CTA Head and Neck (xpd)  Order: [id number]  Status: Final result     Visible to patient: No (inaccessible in Patient Portal)     Next appt: None     0 Result  Notes  Details    Reading Physician Reading Date Result Priority   Wilmer Blanco MD  [phone] 8/28/2023 Routine     Narrative & Impression  EXAMINATION:  CTA HEAD AND NECK (XPD)     CLINICAL HISTORY:  Transient ischemic attack (TIA);     TECHNIQUE:  Non contrast low dose axial images were obtained through the head. CT angiogram was performed from the level of the kimberli to the top of the head following the IV administration of 75mL of Omnipaque 350.   Sagittal and coronal reconstructions and maximum intensity projection reconstructions were performed. Arterial stenosis percentages are based on NASCET measurement criteria.     COMPARISON:  Head CT 08/26/2023.  Carotid ultrasound 08/27/2023.     FINDINGS:  CTA HEAD:     Vasculature: Mild atherosclerotic narrowing of the distal right vertebral artery and bilateral cavernous internal carotid arteries.  No other focal stenosis or proximal large vessel occlusion.There is no suspicion of vascular malformation or saccular aneurysm.     Variant anatomy: No variant anatomy is noted.     Brain: There is no evidence of mass, mass effect, edema, midline shift, intracranial hemorrhage, or space-occupying extra-axial fluid collection.  Grossly stable findings of age-related chronic small vessel ischemic changes.  After the administration of contrast there is no abnormal enhancement.     Ventricles/Sulci: The ventricles and sulci are appropriate in size for age.     Osseous Structures: The osseous structures are unremarkable in appearance.     Sinuses and orbits: No significant opacification.  Ocular lens replacements.  No other focal orbital abnormality.     Other: Visualized portions of the mastoids are unremarkable.     CTA NECK:     Aorta: Conventional branching pattern. The great vessel artery origins are patent noting mild to moderate focal stenosis of the proximal left common carotid and bilateral subclavian arteries.     Vertebral Arteries: Moderate stenosis of the  origin of the right vertebral artery.  The origin of the left vertebral artery is patent.  Both arteries are code dominant.  No other significant stenosis of the cervical vertebral arteries.  No dissection..     Right Carotid: There is a common/internal carotid artery stent which is patent noting mild atherosclerotic related narrowing at the ICA origin of less than 25% by NASCET criteria.  No dissection or occlusion.     Left Carotid: No flow limiting stenosis, occlusion, or dissection of the common carotid or internal carotid arteries. Mild plaque of the proximal ICA.  Right internal carotid artery: Less than 50 % stenosis by and NASCET.     Extravascular Anatomy: Partially imaged median sternotomy/CABG changes.  Emphysematous changes of the partially imaged lung apices.  Degenerative changes of the spine.  No bony destructive changes.     Impression:     1. Intracranial and extracranial atherosclerotic changes most notable for mild to moderate stenosis at the origin of the great vessels of the aortic arch without flow-limiting stenosis.  Additional moderate stenosis at the origin of the right vertebral artery.  2. Patent right common/internal carotid artery stent.  3. No significant intracranial arterial stenosis.  No proximal large vessel occlusion.  4. No acute intracranial CT findings          ASSESSMENT & PLAN:      TIA  Hypertension  Atrial fibrillation      Plan  Admitted for further stroke workup. CTH negative  MRI cannot be done due to pacemaker  CUS: MAGAN stent noted; LICA stenosis of <50%; right vertebral artery retrograde flow possibly indicated subclavian stenosis; left vertebral artery not clearly visualized indicating possible occlusion   Advise to premedicate and obtain CTA head and neck.  CTA head and neck: mild to moderate stenosis at the origina of the great vessels of the aortic arch; moderate stenosis of right vertebral artery; left vertebral artery patent;   STOP aspirin. CONTINUE Plavix 75 mg  once daily   Restart Eliquis 5 mg BID for anticoagulation  Continue Lipitor  Permissive BP to 220 systolic for 24 hrs from symptom onset and after that normalize BP  PT OT  Speech therapy  DVT prophylaxis with chemo/SCD prophylaxis  Discussed lifestyle modifications as prophylactic measures for stroke prevention including adequate blood pressure management, healthy diet and regular exercise.         Thank you kindly for including us in the care of this patient. Please do not hesitate to contact us with any questions.             Amisha Britton DNP  Neurology/vascular Neurology  Date of Service: 08/29/2023  9:49 AM

## 2023-08-29 NOTE — PLAN OF CARE
Problem: Communication Impairment (Stroke, Ischemic/Transient Ischemic Attack)  Goal: Improved Communication Skills  Outcome: Ongoing, Progressing     Problem: Functional Ability Impaired (Stroke, Ischemic/Transient Ischemic Attack)  Goal: Optimal Functional Ability  Outcome: Ongoing, Progressing

## 2023-11-27 PROBLEM — N17.9 AKI (ACUTE KIDNEY INJURY): Status: RESOLVED | Noted: 2023-08-27 | Resolved: 2023-11-27

## 2023-11-27 PROBLEM — G45.9 TIA (TRANSIENT ISCHEMIC ATTACK): Status: RESOLVED | Noted: 2023-08-26 | Resolved: 2023-11-27
